# Patient Record
Sex: MALE | Race: OTHER | HISPANIC OR LATINO
[De-identification: names, ages, dates, MRNs, and addresses within clinical notes are randomized per-mention and may not be internally consistent; named-entity substitution may affect disease eponyms.]

---

## 2017-03-29 ENCOUNTER — APPOINTMENT (OUTPATIENT)
Dept: ELECTROPHYSIOLOGY | Facility: CLINIC | Age: 62
End: 2017-03-29

## 2017-04-11 ENCOUNTER — TRANSCRIPTION ENCOUNTER (OUTPATIENT)
Age: 62
End: 2017-04-11

## 2017-05-28 ENCOUNTER — EMERGENCY (EMERGENCY)
Facility: HOSPITAL | Age: 62
LOS: 1 days | Discharge: HOME | End: 2017-05-28
Attending: EMERGENCY MEDICINE | Admitting: EMERGENCY MEDICINE
Payer: COMMERCIAL

## 2017-05-28 VITALS
OXYGEN SATURATION: 98 % | HEART RATE: 67 BPM | SYSTOLIC BLOOD PRESSURE: 131 MMHG | TEMPERATURE: 98 F | RESPIRATION RATE: 18 BRPM | DIASTOLIC BLOOD PRESSURE: 83 MMHG

## 2017-05-28 VITALS
OXYGEN SATURATION: 100 % | TEMPERATURE: 98 F | HEART RATE: 68 BPM | RESPIRATION RATE: 18 BRPM | SYSTOLIC BLOOD PRESSURE: 137 MMHG | DIASTOLIC BLOOD PRESSURE: 91 MMHG

## 2017-05-28 DIAGNOSIS — E11.9 TYPE 2 DIABETES MELLITUS WITHOUT COMPLICATIONS: ICD-10-CM

## 2017-05-28 DIAGNOSIS — Z79.82 LONG TERM (CURRENT) USE OF ASPIRIN: ICD-10-CM

## 2017-05-28 DIAGNOSIS — R10.9 UNSPECIFIED ABDOMINAL PAIN: ICD-10-CM

## 2017-05-28 DIAGNOSIS — E78.5 HYPERLIPIDEMIA, UNSPECIFIED: ICD-10-CM

## 2017-05-28 DIAGNOSIS — I10 ESSENTIAL (PRIMARY) HYPERTENSION: ICD-10-CM

## 2017-05-28 DIAGNOSIS — Z95.1 PRESENCE OF AORTOCORONARY BYPASS GRAFT: ICD-10-CM

## 2017-05-28 DIAGNOSIS — N20.0 CALCULUS OF KIDNEY: ICD-10-CM

## 2017-05-28 DIAGNOSIS — I25.10 ATHEROSCLEROTIC HEART DISEASE OF NATIVE CORONARY ARTERY WITHOUT ANGINA PECTORIS: ICD-10-CM

## 2017-05-28 LAB
ALBUMIN SERPL ELPH-MCNC: 4.5 G/DL — SIGNIFICANT CHANGE UP (ref 3.3–5)
ALP SERPL-CCNC: 58 U/L — SIGNIFICANT CHANGE UP (ref 40–120)
ALT FLD-CCNC: 15 U/L RC — SIGNIFICANT CHANGE UP (ref 10–45)
ANION GAP SERPL CALC-SCNC: 13 MMOL/L — SIGNIFICANT CHANGE UP (ref 5–17)
APPEARANCE UR: CLEAR — SIGNIFICANT CHANGE UP
AST SERPL-CCNC: 23 U/L — SIGNIFICANT CHANGE UP (ref 10–40)
BASOPHILS # BLD AUTO: 0 K/UL — SIGNIFICANT CHANGE UP (ref 0–0.2)
BASOPHILS NFR BLD AUTO: 0.2 % — SIGNIFICANT CHANGE UP (ref 0–2)
BILIRUB SERPL-MCNC: 0.4 MG/DL — SIGNIFICANT CHANGE UP (ref 0.2–1.2)
BILIRUB UR-MCNC: NEGATIVE — SIGNIFICANT CHANGE UP
BUN SERPL-MCNC: 18 MG/DL — SIGNIFICANT CHANGE UP (ref 7–23)
CALCIUM SERPL-MCNC: 9.7 MG/DL — SIGNIFICANT CHANGE UP (ref 8.4–10.5)
CHLORIDE SERPL-SCNC: 106 MMOL/L — SIGNIFICANT CHANGE UP (ref 96–108)
CO2 SERPL-SCNC: 26 MMOL/L — SIGNIFICANT CHANGE UP (ref 22–31)
COLOR SPEC: YELLOW — SIGNIFICANT CHANGE UP
CREAT SERPL-MCNC: 1.29 MG/DL — SIGNIFICANT CHANGE UP (ref 0.5–1.3)
DIFF PNL FLD: NEGATIVE — SIGNIFICANT CHANGE UP
EOSINOPHIL # BLD AUTO: 0.2 K/UL — SIGNIFICANT CHANGE UP (ref 0–0.5)
EOSINOPHIL NFR BLD AUTO: 3 % — SIGNIFICANT CHANGE UP (ref 0–6)
EPI CELLS # UR: SIGNIFICANT CHANGE UP /HPF
GLUCOSE SERPL-MCNC: 108 MG/DL — HIGH (ref 70–99)
GLUCOSE UR QL: NEGATIVE — SIGNIFICANT CHANGE UP
HCT VFR BLD CALC: 39.7 % — SIGNIFICANT CHANGE UP (ref 39–50)
HGB BLD-MCNC: 13.6 G/DL — SIGNIFICANT CHANGE UP (ref 13–17)
KETONES UR-MCNC: NEGATIVE — SIGNIFICANT CHANGE UP
LEUKOCYTE ESTERASE UR-ACNC: NEGATIVE — SIGNIFICANT CHANGE UP
LYMPHOCYTES # BLD AUTO: 1.2 K/UL — SIGNIFICANT CHANGE UP (ref 1–3.3)
LYMPHOCYTES # BLD AUTO: 17.6 % — SIGNIFICANT CHANGE UP (ref 13–44)
MCHC RBC-ENTMCNC: 31.7 PG — SIGNIFICANT CHANGE UP (ref 27–34)
MCHC RBC-ENTMCNC: 34.3 GM/DL — SIGNIFICANT CHANGE UP (ref 32–36)
MCV RBC AUTO: 92.6 FL — SIGNIFICANT CHANGE UP (ref 80–100)
MONOCYTES # BLD AUTO: 0.5 K/UL — SIGNIFICANT CHANGE UP (ref 0–0.9)
MONOCYTES NFR BLD AUTO: 6.8 % — SIGNIFICANT CHANGE UP (ref 2–14)
NEUTROPHILS # BLD AUTO: 5.1 K/UL — SIGNIFICANT CHANGE UP (ref 1.8–7.4)
NEUTROPHILS NFR BLD AUTO: 72.4 % — SIGNIFICANT CHANGE UP (ref 43–77)
NITRITE UR-MCNC: NEGATIVE — SIGNIFICANT CHANGE UP
PH UR: 5.5 — SIGNIFICANT CHANGE UP (ref 5–8)
PLATELET # BLD AUTO: 298 K/UL — SIGNIFICANT CHANGE UP (ref 150–400)
POTASSIUM SERPL-MCNC: 4.3 MMOL/L — SIGNIFICANT CHANGE UP (ref 3.5–5.3)
POTASSIUM SERPL-SCNC: 4.3 MMOL/L — SIGNIFICANT CHANGE UP (ref 3.5–5.3)
PROT SERPL-MCNC: 7.5 G/DL — SIGNIFICANT CHANGE UP (ref 6–8.3)
PROT UR-MCNC: SIGNIFICANT CHANGE UP
RBC # BLD: 4.29 M/UL — SIGNIFICANT CHANGE UP (ref 4.2–5.8)
RBC # FLD: 11.6 % — SIGNIFICANT CHANGE UP (ref 10.3–14.5)
SODIUM SERPL-SCNC: 145 MMOL/L — SIGNIFICANT CHANGE UP (ref 135–145)
SP GR SPEC: 1.02 — SIGNIFICANT CHANGE UP (ref 1.01–1.02)
UROBILINOGEN FLD QL: NEGATIVE — SIGNIFICANT CHANGE UP
WBC # BLD: 7.1 K/UL — SIGNIFICANT CHANGE UP (ref 3.8–10.5)
WBC # FLD AUTO: 7.1 K/UL — SIGNIFICANT CHANGE UP (ref 3.8–10.5)
WBC UR QL: SIGNIFICANT CHANGE UP /HPF (ref 0–5)

## 2017-05-28 PROCEDURE — 96375 TX/PRO/DX INJ NEW DRUG ADDON: CPT

## 2017-05-28 PROCEDURE — 80053 COMPREHEN METABOLIC PANEL: CPT

## 2017-05-28 PROCEDURE — 96374 THER/PROPH/DIAG INJ IV PUSH: CPT

## 2017-05-28 PROCEDURE — 87086 URINE CULTURE/COLONY COUNT: CPT

## 2017-05-28 PROCEDURE — 74176 CT ABD & PELVIS W/O CONTRAST: CPT | Mod: 26

## 2017-05-28 PROCEDURE — 81001 URINALYSIS AUTO W/SCOPE: CPT

## 2017-05-28 PROCEDURE — 99284 EMERGENCY DEPT VISIT MOD MDM: CPT | Mod: 25

## 2017-05-28 PROCEDURE — 85027 COMPLETE CBC AUTOMATED: CPT

## 2017-05-28 PROCEDURE — 74176 CT ABD & PELVIS W/O CONTRAST: CPT

## 2017-05-28 PROCEDURE — 99285 EMERGENCY DEPT VISIT HI MDM: CPT | Mod: 25

## 2017-05-28 RX ORDER — ONDANSETRON 8 MG/1
4 TABLET, FILM COATED ORAL ONCE
Qty: 0 | Refills: 0 | Status: COMPLETED | OUTPATIENT
Start: 2017-05-28 | End: 2017-05-28

## 2017-05-28 RX ORDER — TAMSULOSIN HYDROCHLORIDE 0.4 MG/1
1 CAPSULE ORAL
Qty: 30 | Refills: 0 | OUTPATIENT
Start: 2017-05-28 | End: 2017-06-27

## 2017-05-28 RX ORDER — ONDANSETRON 8 MG/1
1 TABLET, FILM COATED ORAL
Qty: 12 | Refills: 0 | OUTPATIENT
Start: 2017-05-28 | End: 2017-06-01

## 2017-05-28 RX ORDER — MORPHINE SULFATE 50 MG/1
4 CAPSULE, EXTENDED RELEASE ORAL ONCE
Qty: 0 | Refills: 0 | Status: DISCONTINUED | OUTPATIENT
Start: 2017-05-28 | End: 2017-05-28

## 2017-05-28 RX ORDER — OXYCODONE HYDROCHLORIDE 5 MG/1
1 TABLET ORAL
Qty: 12 | Refills: 0 | OUTPATIENT
Start: 2017-05-28 | End: 2017-05-31

## 2017-05-28 RX ORDER — KETOROLAC TROMETHAMINE 30 MG/ML
15 SYRINGE (ML) INJECTION ONCE
Qty: 0 | Refills: 0 | Status: DISCONTINUED | OUTPATIENT
Start: 2017-05-28 | End: 2017-05-28

## 2017-05-28 RX ORDER — OXYCODONE HYDROCHLORIDE 5 MG/1
5 TABLET ORAL ONCE
Qty: 0 | Refills: 0 | Status: DISCONTINUED | OUTPATIENT
Start: 2017-05-28 | End: 2017-05-28

## 2017-05-28 RX ORDER — OXYCODONE HYDROCHLORIDE 5 MG/1
1 TABLET ORAL
Qty: 16 | Refills: 0 | OUTPATIENT
Start: 2017-05-28 | End: 2017-06-01

## 2017-05-28 RX ORDER — SODIUM CHLORIDE 9 MG/ML
1000 INJECTION INTRAMUSCULAR; INTRAVENOUS; SUBCUTANEOUS ONCE
Qty: 0 | Refills: 0 | Status: COMPLETED | OUTPATIENT
Start: 2017-05-28 | End: 2017-05-28

## 2017-05-28 RX ADMIN — OXYCODONE HYDROCHLORIDE 5 MILLIGRAM(S): 5 TABLET ORAL at 17:23

## 2017-05-28 RX ADMIN — MORPHINE SULFATE 4 MILLIGRAM(S): 50 CAPSULE, EXTENDED RELEASE ORAL at 17:23

## 2017-05-28 RX ADMIN — SODIUM CHLORIDE 1000 MILLILITER(S): 9 INJECTION INTRAMUSCULAR; INTRAVENOUS; SUBCUTANEOUS at 14:50

## 2017-05-28 RX ADMIN — MORPHINE SULFATE 4 MILLIGRAM(S): 50 CAPSULE, EXTENDED RELEASE ORAL at 14:57

## 2017-05-28 RX ADMIN — Medication 15 MILLIGRAM(S): at 15:03

## 2017-05-28 RX ADMIN — ONDANSETRON 4 MILLIGRAM(S): 8 TABLET, FILM COATED ORAL at 14:51

## 2017-05-28 RX ADMIN — Medication 15 MILLIGRAM(S): at 17:23

## 2017-05-28 NOTE — ED PROVIDER NOTE - PHYSICAL EXAMINATION
Gen: Well appearing, NAD, AOx3  Head: NCAT  HEENT: MMM, normal conjunctiva  Lung: CTAB, no rales, rhonchi or wheezing  CV: S1/S2, no murmurs, rubs or gallops  Abd: soft, NTND, no rebound or guarding  MSK: No CVA tenderness. No edema, no visible deformities  Neuro: No focal neurologic deficits.   Skin: Warm and dry, no evidence of rash  Psych: normal mood and affect

## 2017-05-28 NOTE — ED PROVIDER NOTE - NS ED ROS FT
ROS: denies HA, weakness, dizziness, fevers/chills, nausea/vomiting, chest pain, SOB, diaphoresis, abdominal pain, neck pain, dysuria/hematuria, or rash  +flank pain

## 2017-05-28 NOTE — ED PROVIDER NOTE - ATTENDING CONTRIBUTION TO CARE
Attending MD Dawson:  I personally have seen and examined this patient.  Resident note reviewed and agree on plan of care and except where noted.  See HPI, PE, and MDM for details.     Attending MD Dawson: A & O x 3, NAD, HEENT WNL and no facial asymmetry; lungs CTAB, heart with reg rhythm without murmur; abdomen soft NTND; extremities with no edema; neuro exam non focal with no motor or sensory deficits. peripheral pulses full and equal in bilateral upper and lower extremities     61M with h/o kidney stones presenting with right flank pain, concerning for renal colic, will obtain CT a/p to confirm and rule out alternative pathology, UA and pain control

## 2017-05-28 NOTE — ED PROVIDER NOTE - PROGRESS NOTE DETAILS
Attending MD Dawson: pain well controlled, CT with 5mm proximal ureteral stone. Will dc home with urology follow up, flomax, pain meds, antiemetics

## 2017-05-28 NOTE — ED PROVIDER NOTE - MEDICAL DECISION MAKING DETAILS
61 y.o. male pw right flank pain, hx of stone. Will check labs, fluids, analgesia, ct ab/pel, reevaluate.

## 2017-05-28 NOTE — ED ADULT NURSE NOTE - PMH
Borderline diabetes mellitus    CAD (coronary artery disease)    HLD (hyperlipidemia)    HTN (hypertension)

## 2017-05-28 NOTE — ED PROVIDER NOTE - OBJECTIVE STATEMENT
61 y.o. male hx of kidney stones, sp lithotripsy and stenting in the past pw right flank pain for past couple of days, 10/10 at worst and nausea, no fevers, no dysuria. States feels like prior stones. Taking motrin at home with little relief. last dose 7am.

## 2017-05-28 NOTE — ED ADULT NURSE NOTE - OBJECTIVE STATEMENT
60yo male walked into ED a+ox3, c/o right flank pain x 3days, +n/v. Pt states he has a history of kidney stones, and this pain feels like a stone; has required lithotripsy in past. Pt having right flank pain x 3days, non-radiating, 9/10, aching. +nausea, +vomiting x 2, no blood. Pt denies fevers, chills, hematuria, dysuria, urinary retention, sob, cp, recent falls/back strain. Abd soft nontender nondistended. Lung sounds clear in all fields.

## 2017-05-29 LAB
CULTURE RESULTS: SIGNIFICANT CHANGE UP
SPECIMEN SOURCE: SIGNIFICANT CHANGE UP

## 2017-10-17 ENCOUNTER — OUTPATIENT (OUTPATIENT)
Dept: OUTPATIENT SERVICES | Facility: HOSPITAL | Age: 62
LOS: 1 days | End: 2017-10-17
Payer: COMMERCIAL

## 2017-10-17 VITALS
DIASTOLIC BLOOD PRESSURE: 73 MMHG | HEIGHT: 70 IN | WEIGHT: 225.09 LBS | HEART RATE: 71 BPM | RESPIRATION RATE: 16 BRPM | SYSTOLIC BLOOD PRESSURE: 116 MMHG | OXYGEN SATURATION: 99 % | TEMPERATURE: 98 F

## 2017-10-17 DIAGNOSIS — I66.9 OCCLUSION AND STENOSIS OF UNSPECIFIED CEREBRAL ARTERY: ICD-10-CM

## 2017-10-17 DIAGNOSIS — Z98.890 OTHER SPECIFIED POSTPROCEDURAL STATES: Chronic | ICD-10-CM

## 2017-10-17 PROCEDURE — 33284: CPT

## 2017-10-17 PROCEDURE — 93005 ELECTROCARDIOGRAM TRACING: CPT

## 2017-10-17 PROCEDURE — 93010 ELECTROCARDIOGRAM REPORT: CPT

## 2017-10-17 NOTE — H&P CARDIOLOGY - HISTORY OF PRESENT ILLNESS
62 y/o male with h/o CAD s/p CABG x 3 in 2008, HTN, HLD, T2DM controlled and uncomplicated, managed by PCP, A1C unknown, Afib on Eliquis, TIA (no residual) in 2015 and subsequent ILR placement which remains in place.  Patient presents today for ILR removal.

## 2017-10-25 ENCOUNTER — APPOINTMENT (OUTPATIENT)
Dept: ELECTROPHYSIOLOGY | Facility: CLINIC | Age: 62
End: 2017-10-25
Payer: COMMERCIAL

## 2017-10-25 ENCOUNTER — NON-APPOINTMENT (OUTPATIENT)
Age: 62
End: 2017-10-25

## 2017-10-25 VITALS
DIASTOLIC BLOOD PRESSURE: 77 MMHG | WEIGHT: 225 LBS | SYSTOLIC BLOOD PRESSURE: 113 MMHG | BODY MASS INDEX: 32.21 KG/M2 | OXYGEN SATURATION: 98 % | HEIGHT: 70 IN | HEART RATE: 62 BPM

## 2017-10-25 PROCEDURE — 99024 POSTOP FOLLOW-UP VISIT: CPT

## 2017-10-30 ENCOUNTER — APPOINTMENT (OUTPATIENT)
Dept: ELECTROPHYSIOLOGY | Facility: CLINIC | Age: 62
End: 2017-10-30

## 2018-10-16 ENCOUNTER — INPATIENT (INPATIENT)
Facility: HOSPITAL | Age: 63
LOS: 2 days | DRG: 23 | End: 2018-10-19
Attending: INTERNAL MEDICINE | Admitting: SPECIALIST
Payer: COMMERCIAL

## 2018-10-16 VITALS
OXYGEN SATURATION: 98 % | RESPIRATION RATE: 18 BRPM | TEMPERATURE: 98 F | SYSTOLIC BLOOD PRESSURE: 144 MMHG | DIASTOLIC BLOOD PRESSURE: 88 MMHG | HEART RATE: 84 BPM

## 2018-10-16 DIAGNOSIS — Z98.890 OTHER SPECIFIED POSTPROCEDURAL STATES: Chronic | ICD-10-CM

## 2018-10-16 DIAGNOSIS — I63.9 CEREBRAL INFARCTION, UNSPECIFIED: ICD-10-CM

## 2018-10-16 PROBLEM — E11.9 TYPE 2 DIABETES MELLITUS WITHOUT COMPLICATIONS: Chronic | Status: ACTIVE | Noted: 2017-10-17

## 2018-10-16 PROBLEM — G45.9 TRANSIENT CEREBRAL ISCHEMIC ATTACK, UNSPECIFIED: Chronic | Status: ACTIVE | Noted: 2017-10-17

## 2018-10-16 PROBLEM — N20.0 CALCULUS OF KIDNEY: Chronic | Status: ACTIVE | Noted: 2017-10-17

## 2018-10-16 PROBLEM — I48.91 UNSPECIFIED ATRIAL FIBRILLATION: Chronic | Status: ACTIVE | Noted: 2017-10-17

## 2018-10-16 LAB
ALBUMIN SERPL ELPH-MCNC: 4.2 G/DL — SIGNIFICANT CHANGE UP (ref 3.3–5)
ALP SERPL-CCNC: 91 U/L — SIGNIFICANT CHANGE UP (ref 40–120)
ALT FLD-CCNC: 17 U/L — SIGNIFICANT CHANGE UP (ref 10–45)
ANION GAP SERPL CALC-SCNC: 12 MMOL/L — SIGNIFICANT CHANGE UP (ref 5–17)
ANION GAP SERPL CALC-SCNC: 14 MMOL/L — SIGNIFICANT CHANGE UP (ref 5–17)
ANION GAP SERPL CALC-SCNC: 15 MMOL/L — SIGNIFICANT CHANGE UP (ref 5–17)
APTT BLD: 28.2 SEC — SIGNIFICANT CHANGE UP (ref 27.5–37.4)
APTT BLD: 30.8 SEC — SIGNIFICANT CHANGE UP (ref 27.5–37.4)
APTT BLD: 33.8 SEC — SIGNIFICANT CHANGE UP (ref 27.5–37.4)
AST SERPL-CCNC: 20 U/L — SIGNIFICANT CHANGE UP (ref 10–40)
BASE EXCESS BLDA CALC-SCNC: -0.8 MMOL/L — SIGNIFICANT CHANGE UP (ref -2–2)
BASOPHILS # BLD AUTO: 0 K/UL — SIGNIFICANT CHANGE UP (ref 0–0.2)
BASOPHILS NFR BLD AUTO: 0.7 % — SIGNIFICANT CHANGE UP (ref 0–2)
BILIRUB SERPL-MCNC: 0.3 MG/DL — SIGNIFICANT CHANGE UP (ref 0.2–1.2)
BUN SERPL-MCNC: 14 MG/DL — SIGNIFICANT CHANGE UP (ref 7–23)
BUN SERPL-MCNC: 14 MG/DL — SIGNIFICANT CHANGE UP (ref 7–23)
BUN SERPL-MCNC: 15 MG/DL — SIGNIFICANT CHANGE UP (ref 7–23)
CALCIUM SERPL-MCNC: 8.3 MG/DL — LOW (ref 8.4–10.5)
CALCIUM SERPL-MCNC: 8.5 MG/DL — SIGNIFICANT CHANGE UP (ref 8.4–10.5)
CALCIUM SERPL-MCNC: 9.1 MG/DL — SIGNIFICANT CHANGE UP (ref 8.4–10.5)
CHLORIDE SERPL-SCNC: 102 MMOL/L — SIGNIFICANT CHANGE UP (ref 96–108)
CHLORIDE SERPL-SCNC: 103 MMOL/L — SIGNIFICANT CHANGE UP (ref 96–108)
CHLORIDE SERPL-SCNC: 104 MMOL/L — SIGNIFICANT CHANGE UP (ref 96–108)
CK SERPL-CCNC: 226 U/L — HIGH (ref 30–200)
CO2 BLDA-SCNC: 28 MMOL/L — SIGNIFICANT CHANGE UP (ref 22–30)
CO2 SERPL-SCNC: 22 MMOL/L — SIGNIFICANT CHANGE UP (ref 22–31)
CO2 SERPL-SCNC: 24 MMOL/L — SIGNIFICANT CHANGE UP (ref 22–31)
CO2 SERPL-SCNC: 25 MMOL/L — SIGNIFICANT CHANGE UP (ref 22–31)
CREAT SERPL-MCNC: 1.17 MG/DL — SIGNIFICANT CHANGE UP (ref 0.5–1.3)
CREAT SERPL-MCNC: 1.18 MG/DL — SIGNIFICANT CHANGE UP (ref 0.5–1.3)
CREAT SERPL-MCNC: 1.37 MG/DL — HIGH (ref 0.5–1.3)
EOSINOPHIL # BLD AUTO: 0.4 K/UL — SIGNIFICANT CHANGE UP (ref 0–0.5)
EOSINOPHIL NFR BLD AUTO: 5.6 % — SIGNIFICANT CHANGE UP (ref 0–6)
FIBRINOGEN PPP-MCNC: 493 MG/DL — SIGNIFICANT CHANGE UP (ref 310–510)
GAS PNL BLDA: SIGNIFICANT CHANGE UP
GAS PNL BLDA: SIGNIFICANT CHANGE UP
GLUCOSE SERPL-MCNC: 109 MG/DL — HIGH (ref 70–99)
GLUCOSE SERPL-MCNC: 133 MG/DL — HIGH (ref 70–99)
GLUCOSE SERPL-MCNC: 198 MG/DL — HIGH (ref 70–99)
HBA1C BLD-MCNC: 5.8 % — HIGH (ref 4–5.6)
HCO3 BLDA-SCNC: 26 MMOL/L — SIGNIFICANT CHANGE UP (ref 21–29)
HCT VFR BLD CALC: 34.2 % — LOW (ref 39–50)
HCT VFR BLD CALC: 34.2 % — LOW (ref 39–50)
HCT VFR BLD CALC: 40.5 % — SIGNIFICANT CHANGE UP (ref 39–50)
HGB BLD-MCNC: 11.5 G/DL — LOW (ref 13–17)
HGB BLD-MCNC: 11.7 G/DL — LOW (ref 13–17)
HGB BLD-MCNC: 13.7 G/DL — SIGNIFICANT CHANGE UP (ref 13–17)
HOROWITZ INDEX BLDA+IHG-RTO: 40 — SIGNIFICANT CHANGE UP
INR BLD: 0.93 RATIO — SIGNIFICANT CHANGE UP (ref 0.88–1.16)
INR BLD: 1.25 RATIO — HIGH (ref 0.88–1.16)
INR BLD: 1.4 RATIO — HIGH (ref 0.88–1.16)
LYMPHOCYTES # BLD AUTO: 1.5 K/UL — SIGNIFICANT CHANGE UP (ref 1–3.3)
LYMPHOCYTES # BLD AUTO: 24.3 % — SIGNIFICANT CHANGE UP (ref 13–44)
MAGNESIUM SERPL-MCNC: 1.8 MG/DL — SIGNIFICANT CHANGE UP (ref 1.6–2.6)
MCHC RBC-ENTMCNC: 30.1 PG — SIGNIFICANT CHANGE UP (ref 27–34)
MCHC RBC-ENTMCNC: 30.6 PG — SIGNIFICANT CHANGE UP (ref 27–34)
MCHC RBC-ENTMCNC: 30.7 PG — SIGNIFICANT CHANGE UP (ref 27–34)
MCHC RBC-ENTMCNC: 33.6 GM/DL — SIGNIFICANT CHANGE UP (ref 32–36)
MCHC RBC-ENTMCNC: 33.9 GM/DL — SIGNIFICANT CHANGE UP (ref 32–36)
MCHC RBC-ENTMCNC: 34.1 GM/DL — SIGNIFICANT CHANGE UP (ref 32–36)
MCV RBC AUTO: 89.6 FL — SIGNIFICANT CHANGE UP (ref 80–100)
MCV RBC AUTO: 90.1 FL — SIGNIFICANT CHANGE UP (ref 80–100)
MCV RBC AUTO: 90.4 FL — SIGNIFICANT CHANGE UP (ref 80–100)
MONOCYTES # BLD AUTO: 0.6 K/UL — SIGNIFICANT CHANGE UP (ref 0–0.9)
MONOCYTES NFR BLD AUTO: 9.4 % — SIGNIFICANT CHANGE UP (ref 2–14)
NEUTROPHILS # BLD AUTO: 3.8 K/UL — SIGNIFICANT CHANGE UP (ref 1.8–7.4)
NEUTROPHILS NFR BLD AUTO: 60 % — SIGNIFICANT CHANGE UP (ref 43–77)
PCO2 BLDA: 59 MMHG — HIGH (ref 32–46)
PH BLDA: 7.28 — LOW (ref 7.35–7.45)
PHOSPHATE SERPL-MCNC: 4 MG/DL — SIGNIFICANT CHANGE UP (ref 2.5–4.5)
PLATELET # BLD AUTO: 261 K/UL — SIGNIFICANT CHANGE UP (ref 150–400)
PLATELET # BLD AUTO: 300 K/UL — SIGNIFICANT CHANGE UP (ref 150–400)
PLATELET # BLD AUTO: 343 K/UL — SIGNIFICANT CHANGE UP (ref 150–400)
PO2 BLDA: 111 MMHG — HIGH (ref 74–108)
POTASSIUM SERPL-MCNC: 3.7 MMOL/L — SIGNIFICANT CHANGE UP (ref 3.5–5.3)
POTASSIUM SERPL-MCNC: 4 MMOL/L — SIGNIFICANT CHANGE UP (ref 3.5–5.3)
POTASSIUM SERPL-MCNC: 4.1 MMOL/L — SIGNIFICANT CHANGE UP (ref 3.5–5.3)
POTASSIUM SERPL-SCNC: 3.7 MMOL/L — SIGNIFICANT CHANGE UP (ref 3.5–5.3)
POTASSIUM SERPL-SCNC: 4 MMOL/L — SIGNIFICANT CHANGE UP (ref 3.5–5.3)
POTASSIUM SERPL-SCNC: 4.1 MMOL/L — SIGNIFICANT CHANGE UP (ref 3.5–5.3)
PROT SERPL-MCNC: 7.3 G/DL — SIGNIFICANT CHANGE UP (ref 6–8.3)
PROTHROM AB SERPL-ACNC: 10 SEC — SIGNIFICANT CHANGE UP (ref 9.8–12.7)
PROTHROM AB SERPL-ACNC: 13.7 SEC — HIGH (ref 9.8–12.7)
PROTHROM AB SERPL-ACNC: 15.4 SEC — HIGH (ref 9.8–12.7)
RBC # BLD: 3.8 M/UL — LOW (ref 4.2–5.8)
RBC # BLD: 3.82 M/UL — LOW (ref 4.2–5.8)
RBC # BLD: 4.48 M/UL — SIGNIFICANT CHANGE UP (ref 4.2–5.8)
RBC # FLD: 11.5 % — SIGNIFICANT CHANGE UP (ref 10.3–14.5)
RBC # FLD: 11.7 % — SIGNIFICANT CHANGE UP (ref 10.3–14.5)
RBC # FLD: 12 % — SIGNIFICANT CHANGE UP (ref 10.3–14.5)
RH IG SCN BLD-IMP: POSITIVE — SIGNIFICANT CHANGE UP
SAO2 % BLDA: 98 % — HIGH (ref 92–96)
SODIUM SERPL-SCNC: 140 MMOL/L — SIGNIFICANT CHANGE UP (ref 135–145)
SODIUM SERPL-SCNC: 140 MMOL/L — SIGNIFICANT CHANGE UP (ref 135–145)
SODIUM SERPL-SCNC: 141 MMOL/L — SIGNIFICANT CHANGE UP (ref 135–145)
TROPONIN T, HIGH SENSITIVITY RESULT: 9 NG/L — SIGNIFICANT CHANGE UP (ref 0–51)
WBC # BLD: 11.5 K/UL — HIGH (ref 3.8–10.5)
WBC # BLD: 6.2 K/UL — SIGNIFICANT CHANGE UP (ref 3.8–10.5)
WBC # BLD: 9.4 K/UL — SIGNIFICANT CHANGE UP (ref 3.8–10.5)
WBC # FLD AUTO: 11.5 K/UL — HIGH (ref 3.8–10.5)
WBC # FLD AUTO: 6.2 K/UL — SIGNIFICANT CHANGE UP (ref 3.8–10.5)
WBC # FLD AUTO: 9.4 K/UL — SIGNIFICANT CHANGE UP (ref 3.8–10.5)

## 2018-10-16 PROCEDURE — 70496 CT ANGIOGRAPHY HEAD: CPT | Mod: 26

## 2018-10-16 PROCEDURE — 71045 X-RAY EXAM CHEST 1 VIEW: CPT | Mod: 26

## 2018-10-16 PROCEDURE — 99291 CRITICAL CARE FIRST HOUR: CPT

## 2018-10-16 PROCEDURE — 99285 EMERGENCY DEPT VISIT HI MDM: CPT | Mod: 25

## 2018-10-16 PROCEDURE — 70498 CT ANGIOGRAPHY NECK: CPT | Mod: 26

## 2018-10-16 PROCEDURE — 99222 1ST HOSP IP/OBS MODERATE 55: CPT

## 2018-10-16 PROCEDURE — 70450 CT HEAD/BRAIN W/O DYE: CPT | Mod: 26,77

## 2018-10-16 PROCEDURE — 93010 ELECTROCARDIOGRAM REPORT: CPT | Mod: NC

## 2018-10-16 RX ORDER — ALTEPLASE 100 MG
81 KIT INTRAVENOUS ONCE
Qty: 0 | Refills: 0 | Status: COMPLETED | OUTPATIENT
Start: 2018-10-16 | End: 2018-10-16

## 2018-10-16 RX ORDER — DEXTROSE 50 % IN WATER 50 %
25 SYRINGE (ML) INTRAVENOUS ONCE
Qty: 0 | Refills: 0 | Status: DISCONTINUED | OUTPATIENT
Start: 2018-10-16 | End: 2018-10-17

## 2018-10-16 RX ORDER — LIRAGLUTIDE 6 MG/ML
1.2 INJECTION SUBCUTANEOUS
Qty: 0 | Refills: 0 | COMMUNITY

## 2018-10-16 RX ORDER — FOLIC ACID 0.8 MG
1 TABLET ORAL DAILY
Qty: 0 | Refills: 0 | Status: DISCONTINUED | OUTPATIENT
Start: 2018-10-16 | End: 2018-10-18

## 2018-10-16 RX ORDER — ATORVASTATIN CALCIUM 80 MG/1
80 TABLET, FILM COATED ORAL AT BEDTIME
Qty: 0 | Refills: 0 | Status: DISCONTINUED | OUTPATIENT
Start: 2018-10-16 | End: 2018-10-16

## 2018-10-16 RX ORDER — PHENYLEPHRINE HYDROCHLORIDE 10 MG/ML
1 INJECTION INTRAVENOUS
Qty: 40 | Refills: 0 | Status: DISCONTINUED | OUTPATIENT
Start: 2018-10-16 | End: 2018-10-17

## 2018-10-16 RX ORDER — PANTOPRAZOLE SODIUM 20 MG/1
40 TABLET, DELAYED RELEASE ORAL DAILY
Qty: 0 | Refills: 0 | Status: DISCONTINUED | OUTPATIENT
Start: 2018-10-16 | End: 2018-10-19

## 2018-10-16 RX ORDER — DEXMEDETOMIDINE HYDROCHLORIDE IN 0.9% SODIUM CHLORIDE 4 UG/ML
0.2 INJECTION INTRAVENOUS
Qty: 200 | Refills: 0 | Status: DISCONTINUED | OUTPATIENT
Start: 2018-10-16 | End: 2018-10-16

## 2018-10-16 RX ORDER — DEXTROSE MONOHYDRATE, SODIUM CHLORIDE, AND POTASSIUM CHLORIDE 50; .745; 4.5 G/1000ML; G/1000ML; G/1000ML
1000 INJECTION, SOLUTION INTRAVENOUS
Qty: 0 | Refills: 0 | Status: DISCONTINUED | OUTPATIENT
Start: 2018-10-16 | End: 2018-10-17

## 2018-10-16 RX ORDER — SODIUM CHLORIDE 9 MG/ML
1000 INJECTION INTRAMUSCULAR; INTRAVENOUS; SUBCUTANEOUS
Qty: 0 | Refills: 0 | Status: DISCONTINUED | OUTPATIENT
Start: 2018-10-16 | End: 2018-10-16

## 2018-10-16 RX ORDER — FENTANYL CITRATE 50 UG/ML
50 INJECTION INTRAVENOUS ONCE
Qty: 0 | Refills: 0 | Status: DISCONTINUED | OUTPATIENT
Start: 2018-10-16 | End: 2018-10-16

## 2018-10-16 RX ORDER — EVOLOCUMAB 140 MG/ML
0 INJECTION, SOLUTION SUBCUTANEOUS
Qty: 0 | Refills: 0 | COMMUNITY

## 2018-10-16 RX ORDER — DEXTROSE 50 % IN WATER 50 %
12.5 SYRINGE (ML) INTRAVENOUS ONCE
Qty: 0 | Refills: 0 | Status: DISCONTINUED | OUTPATIENT
Start: 2018-10-16 | End: 2018-10-17

## 2018-10-16 RX ORDER — CARVEDILOL PHOSPHATE 80 MG/1
1 CAPSULE, EXTENDED RELEASE ORAL
Qty: 0 | Refills: 0 | COMMUNITY

## 2018-10-16 RX ORDER — METFORMIN HYDROCHLORIDE 850 MG/1
1 TABLET ORAL
Qty: 0 | Refills: 0 | COMMUNITY

## 2018-10-16 RX ORDER — FOLIC ACID 0.8 MG
1 TABLET ORAL
Qty: 0 | Refills: 0 | COMMUNITY

## 2018-10-16 RX ORDER — FUROSEMIDE 40 MG
20 TABLET ORAL ONCE
Qty: 0 | Refills: 0 | Status: DISCONTINUED | OUTPATIENT
Start: 2018-10-16 | End: 2018-10-16

## 2018-10-16 RX ORDER — DEXMEDETOMIDINE HYDROCHLORIDE IN 0.9% SODIUM CHLORIDE 4 UG/ML
0.2 INJECTION INTRAVENOUS
Qty: 200 | Refills: 0 | Status: DISCONTINUED | OUTPATIENT
Start: 2018-10-16 | End: 2018-10-18

## 2018-10-16 RX ORDER — POTASSIUM CHLORIDE 20 MEQ
20 PACKET (EA) ORAL ONCE
Qty: 0 | Refills: 0 | Status: COMPLETED | OUTPATIENT
Start: 2018-10-16 | End: 2018-10-16

## 2018-10-16 RX ORDER — PROTHROMBIN COMPLEX CONCENTRATE (HUMAN) 25.5; 16.5; 24; 22; 22; 26 [IU]/ML; [IU]/ML; [IU]/ML; [IU]/ML; [IU]/ML; [IU]/ML
1000 POWDER, FOR SOLUTION INTRAVENOUS ONCE
Qty: 0 | Refills: 0 | Status: DISCONTINUED | OUTPATIENT
Start: 2018-10-16 | End: 2018-10-16

## 2018-10-16 RX ORDER — INSULIN LISPRO 100/ML
VIAL (ML) SUBCUTANEOUS
Qty: 0 | Refills: 0 | Status: DISCONTINUED | OUTPATIENT
Start: 2018-10-16 | End: 2018-10-17

## 2018-10-16 RX ORDER — ALTEPLASE 100 MG
9 KIT INTRAVENOUS ONCE
Qty: 0 | Refills: 0 | Status: COMPLETED | OUTPATIENT
Start: 2018-10-16 | End: 2018-10-16

## 2018-10-16 RX ORDER — ASPIRIN/CALCIUM CARB/MAGNESIUM 324 MG
1 TABLET ORAL
Qty: 0 | Refills: 0 | COMMUNITY

## 2018-10-16 RX ORDER — ACETAMINOPHEN 500 MG
1000 TABLET ORAL ONCE
Qty: 0 | Refills: 0 | Status: COMPLETED | OUTPATIENT
Start: 2018-10-16 | End: 2018-10-16

## 2018-10-16 RX ORDER — CHLORHEXIDINE GLUCONATE 213 G/1000ML
15 SOLUTION TOPICAL
Qty: 0 | Refills: 0 | Status: DISCONTINUED | OUTPATIENT
Start: 2018-10-16 | End: 2018-10-19

## 2018-10-16 RX ORDER — DEXTROSE 50 % IN WATER 50 %
15 SYRINGE (ML) INTRAVENOUS ONCE
Qty: 0 | Refills: 0 | Status: DISCONTINUED | OUTPATIENT
Start: 2018-10-16 | End: 2018-10-17

## 2018-10-16 RX ORDER — PROTHROMBIN COMPLEX CONCENTRATE (HUMAN) 25.5; 16.5; 24; 22; 22; 26 [IU]/ML; [IU]/ML; [IU]/ML; [IU]/ML; [IU]/ML; [IU]/ML
1500 POWDER, FOR SOLUTION INTRAVENOUS ONCE
Qty: 0 | Refills: 0 | Status: DISCONTINUED | OUTPATIENT
Start: 2018-10-16 | End: 2018-10-16

## 2018-10-16 RX ORDER — GLUCAGON INJECTION, SOLUTION 0.5 MG/.1ML
1 INJECTION, SOLUTION SUBCUTANEOUS ONCE
Qty: 0 | Refills: 0 | Status: DISCONTINUED | OUTPATIENT
Start: 2018-10-16 | End: 2018-10-18

## 2018-10-16 RX ORDER — UBIDECARENONE 100 MG
0 CAPSULE ORAL
Qty: 0 | Refills: 0 | COMMUNITY

## 2018-10-16 RX ORDER — ATORVASTATIN CALCIUM 80 MG/1
80 TABLET, FILM COATED ORAL AT BEDTIME
Qty: 0 | Refills: 0 | Status: DISCONTINUED | OUTPATIENT
Start: 2018-10-16 | End: 2018-10-18

## 2018-10-16 RX ORDER — DESMOPRESSIN ACETATE 0.1 MG/1
30 TABLET ORAL ONCE
Qty: 0 | Refills: 0 | Status: COMPLETED | OUTPATIENT
Start: 2018-10-16 | End: 2018-10-16

## 2018-10-16 RX ORDER — PHENYLEPHRINE HYDROCHLORIDE 10 MG/ML
1 INJECTION INTRAVENOUS
Qty: 160 | Refills: 0 | Status: DISCONTINUED | OUTPATIENT
Start: 2018-10-16 | End: 2018-10-16

## 2018-10-16 RX ORDER — FUROSEMIDE 40 MG
10 TABLET ORAL ONCE
Qty: 0 | Refills: 0 | Status: COMPLETED | OUTPATIENT
Start: 2018-10-16 | End: 2018-10-16

## 2018-10-16 RX ORDER — SODIUM CHLORIDE 9 MG/ML
1000 INJECTION, SOLUTION INTRAVENOUS
Qty: 0 | Refills: 0 | Status: DISCONTINUED | OUTPATIENT
Start: 2018-10-16 | End: 2018-10-17

## 2018-10-16 RX ADMIN — FENTANYL CITRATE 50 MICROGRAM(S): 50 INJECTION INTRAVENOUS at 15:30

## 2018-10-16 RX ADMIN — FENTANYL CITRATE 50 MICROGRAM(S): 50 INJECTION INTRAVENOUS at 15:15

## 2018-10-16 RX ADMIN — Medication 50 MILLIEQUIVALENT(S): at 17:44

## 2018-10-16 RX ADMIN — ALTEPLASE 81 MILLIGRAM(S): KIT at 08:14

## 2018-10-16 RX ADMIN — ONDANSETRON 4 MILLIGRAM(S): 8 TABLET, FILM COATED ORAL at 22:00

## 2018-10-16 RX ADMIN — CHLORHEXIDINE GLUCONATE 15 MILLILITER(S): 213 SOLUTION TOPICAL at 17:44

## 2018-10-16 RX ADMIN — ALTEPLASE 81 MILLIGRAM(S): KIT at 07:14

## 2018-10-16 RX ADMIN — ATORVASTATIN CALCIUM 80 MILLIGRAM(S): 80 TABLET, FILM COATED ORAL at 21:14

## 2018-10-16 RX ADMIN — PHENYLEPHRINE HYDROCHLORIDE 39.52 MICROGRAM(S)/KG/MIN: 10 INJECTION INTRAVENOUS at 17:37

## 2018-10-16 RX ADMIN — Medication 1: at 16:45

## 2018-10-16 RX ADMIN — ALTEPLASE 540 MILLIGRAM(S): KIT at 07:13

## 2018-10-16 RX ADMIN — FENTANYL CITRATE 50 MICROGRAM(S): 50 INJECTION INTRAVENOUS at 15:45

## 2018-10-16 RX ADMIN — Medication 100 MILLIGRAM(S): at 23:35

## 2018-10-16 RX ADMIN — Medication 400 MILLIGRAM(S): at 12:19

## 2018-10-16 RX ADMIN — FENTANYL CITRATE 50 MICROGRAM(S): 50 INJECTION INTRAVENOUS at 15:00

## 2018-10-16 RX ADMIN — SODIUM CHLORIDE 75 MILLILITER(S): 9 INJECTION INTRAMUSCULAR; INTRAVENOUS; SUBCUTANEOUS at 08:20

## 2018-10-16 RX ADMIN — PANTOPRAZOLE SODIUM 40 MILLIGRAM(S): 20 TABLET, DELAYED RELEASE ORAL at 17:44

## 2018-10-16 RX ADMIN — Medication 10 MILLIGRAM(S): at 17:38

## 2018-10-16 RX ADMIN — DESMOPRESSIN ACETATE 230 MICROGRAM(S): 0.1 TABLET ORAL at 16:44

## 2018-10-16 RX ADMIN — DEXMEDETOMIDINE HYDROCHLORIDE IN 0.9% SODIUM CHLORIDE 5.27 MICROGRAM(S)/KG/HR: 4 INJECTION INTRAVENOUS at 17:38

## 2018-10-16 NOTE — PROGRESS NOTE ADULT - ASSESSMENT
Summary: 62 year old male pmhx of paroxysmal Afib (on Eliquis), CVA and TIA presented to ED w/ L sided weakness. NIHSS 3. Received tPA, and was noted to have worsening of neuro exam. Repeat CT revealed pontine hemorrhage.     NEURO:  q1h neuro checks  Received 10 units of cryoprecipitate, 1 unit of platelets, 30 DDAVP and 2 units of plasma  Received tPA at   post tpA CT at 6-8 hours    CARDS:  -150    PULM:  Intubated    RENAL:  IVL    GASTRO:  advance as tolerated  ---> Stress ulcer prophylaxis:  PPI    HEME:  monitor H/H    ---> DVT prophylaxis: SCDs, hold anticoagulation, fresh    ENDO:  euglycemia    ID:  afebrile    Code status:  Full code  Disposition:  ICU    This patient was at high risk of neurologic deterioration and/or death due to:     Time spent:  45 minutes Summary: 62 year old male pmhx of paroxysmal Afib (on Eliquis), CVA and TIA presented to ED w/ L sided weakness. NIHSS 3. Received tPA, and was noted to have worsening of neuro exam. Repeat CT revealed pontine hemorrhage.     NEURO:  q1h neuro checks  Received 10 units of cryoprecipitate, 1 unit of platelets, 30 DDAVP and 2 units of plasma  Received tPA at   post tpA CT at 6-8 hours    CARDS:  -150    PULM:  Intubated  VAP ppx Chlorhexidine  CXR    RENAL:  IVL    GASTRO:  advance as tolerated  ---> Stress ulcer prophylaxis:  PPI    HEME:  monitor H/H    ---> DVT prophylaxis: SCDs, hold anticoagulation, fresh    ENDO:  euglycemia    ID:  afebrile    Code status:  Full code  Disposition:  ICU    This patient was at high risk of neurologic deterioration and/or death due to:     Time spent:  45 minutes Summary: 62 year old male pmhx of paroxysmal Afib (on Eliquis), CVA and TIA presented to ED w/ L sided weakness. NIHSS 3. Received tPA, and was noted to have worsening of neuro exam. Repeat CT revealed pontine hemorrhage.     NEURO:  q1h neuro checks  Received 10 units of cryoprecipitate, 1 unit of platelets, 30 DDAVP and 2 units of plasma  Received tPA at 0713AM (10/15); at the time of tPA administration it was not known patient was on Eliquis  Patient will need post tpA CT to monitor bleed  Stroke core measures: A1c, lipid panel    CARDS:  SBP <140  Nicardipine gtt if needed    PULM:  Intubated  VAP ppx Chlorhexidine  CXR  ABG    RENAL:  NS w/ KCl @ 75 ml/hr    GASTRO:  NPO  ---> Stress ulcer prophylaxis:  PPI    HEME:  monitor H/H    ---> DVT prophylaxis: SCDs, hold anticoagulation due to brain bleed    ENDO:  euglycemia  SSI    ID:  Monitor for fevers    Code status:  Full code  Disposition:  ICU    This patient was at high risk of neurologic deterioration and/or death due to: brain bleed    Time spent:  45 minutes

## 2018-10-16 NOTE — ED PROVIDER NOTE - MEDICAL DECISION MAKING DETAILS
Alex Benitez (Resident): 61 y/o male presents with L leg and L arm weakness - code stroke called, last normal 3:15 am - within window for TPA - neuro at bedside - had discussion w/ patient, daughter, and wife about risks of TPA and patient agreed to TPA - admitting to stroke unit - BP WNL, safe to give TPA

## 2018-10-16 NOTE — H&P ADULT - HISTORY OF PRESENT ILLNESS
The patient is a 63 yo m with PMHx of stroke with unknown origin on eliquis last dose > 25 hours before who presented with sudden onset of left sided weakness. He woke up at 330 am when he was at his baseline, he goes back to sleep when he woke up at 7 am he felt his left side is weak and he has a facial droop. No visual disturbances. no diplopia.  He is within the window for tPA, and he missed the last night dose of Eliquis, PT/PTT/INR are WNL. Risks and benefits of treatment were explained. He is agreeable with the tPA treatment. The patient is a 63 yo m with PMHx of stroke with unknown origin on eliquis last dose > 25 hours before who presented with sudden onset of left sided weakness. He woke up at 330 am when he was at his baseline, he goes back to sleep when he woke up at 7 am he felt his left side is weak and he has a facial droop. No visual disturbances. no diplopia.  He is within the window for tPA, and he missed the last night dose of Eliquis, PT/PTT/INR are WNL. Risks and benefits of treatment were explained. He is agreeable with the tPA treatment. Patient is not sure why he is on Eliquis, states it may be due to multiple TIAs in the past, reported he may have had paroxysmal Afib but is not sure.     Bolus of 9mg recieved at 7:13am which was followed by a 81mg drip.    NIHSS-3, MRS-0

## 2018-10-16 NOTE — DISCHARGE NOTE ADULT - NS AS DC STROKE ED MATERIALS
Stroke Education Booklet/Call 911 for Stroke/Prescribed Medications/Risk Factors for Stroke/Stroke Warning Signs and Symptoms/Need for Followup After Discharge

## 2018-10-16 NOTE — PROCEDURE NOTE - NSPROCDETAILS_GEN_ALL_CORE
connected to a pressurized flush line/positive blood return obtained via catheter/sutured in place/all materials/supplies accounted for at end of procedure/location identified, draped/prepped, sterile technique used, needle inserted/introduced/hemostasis with direct pressure, dressing applied

## 2018-10-16 NOTE — H&P ADULT - NSHPLABSRESULTS_GEN_ALL_CORE
Noncontrast CT brain: There is no evidence of acute intracranial   hemorrhage, extra-axial collection, vasogenic edema, mass effect, midline   shift, central herniation, hydrocephalus or transcortical infarct.     Chronic right MCA territory infarct.    CT angiography neck: No evidence of hemodynamically significant stenosis   using NASCET criteria.  Patent vertebral arteries. Atherosclerotic   disease as above    CT angiography brain: No major vessel occlusion or proximal stenosis.

## 2018-10-16 NOTE — ED PROVIDER NOTE - OBJECTIVE STATEMENT
61 y/o male hx of HTN and TIA In the past, on ASA p/w L leg and arm weakness. States he woke up at 3:15 am, was able to walk to bathroom unassisted w/o any weakness. Woke up at 5:00 am and had weakness. Presents to ED at 6:45 am - code stroke called.

## 2018-10-16 NOTE — ED ADULT NURSE REASSESSMENT NOTE - NS ED NURSE REASSESS COMMENT FT1
Pt. has no complaints at this time. Continuing to monitor and assess at bedside. Safety and comfort provided. Awaiting bed assignment.

## 2018-10-16 NOTE — PROCEDURE NOTE - NSPOSTCAREGUIDE_GEN_A_CORE
Keep the cast/splint/dressing clean and dry/Instructed patient/caregiver to follow-up with primary care physician/Care for catheter as per unit/ICU protocols/Instructed patient/caregiver regarding signs and symptoms of infection/Verbal/written post procedure instructions were given to patient/caregiver

## 2018-10-16 NOTE — H&P ADULT - ATTENDING COMMENTS
VASCULAR NEUROLOGY ATTENDING  The patient is seen and examined the history and imaging are reviewed. I agree with the resident note unless otherwise noted. Patient with history of stroke and known AF on Eliquis. Missed dose last night, this AM awoke normal then developed L sided 4/5. Treated with IVtpa per protocol (risk of IVtpa and anticoagulant use explained to patient). Currently at baseline. Suspect aborted R hemispheric infarct. Will continue post TPA protocol. MRI/A restart AC when able. Compliance encouraged. Outpatient neurologic and cardiac follow-up.

## 2018-10-16 NOTE — DISCHARGE NOTE ADULT - PATIENT PORTAL LINK FT
You can access the [facility], offered by [facility], by registering with the following website: [email or web address]

## 2018-10-16 NOTE — ED ADULT NURSE NOTE - CHPI ED NUR SYMPTOMS NEG
no fever/no confusion/no blurred vision/no loss of consciousness/no nausea/no vomiting/no change in level of consciousness/no dizziness

## 2018-10-16 NOTE — CONSULT NOTE ADULT - SUBJECTIVE AND OBJECTIVE BOX
p ([id number])     HPI:  Rolando Hurtado, 63 yo m with PMHx of stroke with sudden onset left sided weakness early this am, on elliquis now sp tpa.  Decline in neuroexam.  CTH showed right pontine heme. 4th open, prepontine cistern open.     PAST MEDICAL HISTORY   Renal calculus  Atrial fibrillation, unspecified type  Transient cerebral ischemia, unspecified type  Type 2 diabetes mellitus without complication, without long-term current use of insulin  Borderline diabetes mellitus  HLD (hyperlipidemia)  HTN (hypertension)  CAD (coronary artery disease)    PAST SURGICAL HISTORY   Status post laser lithotripsy of ureteral calculus  S/P coronary artery bypass graft x 3        MEDICATIONS:  Antibiotics:    Neuro:    Anticoagulation:    Other:  atorvastatin 80 milliGRAM(s) Oral at bedtime  dextrose 40% Gel 15 Gram(s) Oral once PRN  dextrose 5%. 1000 milliLiter(s) IV Continuous <Continuous>  dextrose 50% Injectable 12.5 Gram(s) IV Push once  dextrose 50% Injectable 25 Gram(s) IV Push once  dextrose 50% Injectable 25 Gram(s) IV Push once  folic acid 1 milliGRAM(s) Oral daily  glucagon  Injectable 1 milliGRAM(s) IntraMuscular once PRN  insulin lispro (HumaLOG) corrective regimen sliding scale   SubCutaneous three times a day before meals  multivitamin 1 Tablet(s) Oral daily  sodium chloride 0.9%. 1000 milliLiter(s) IV Continuous <Continuous>      SOCIAL HISTORY:   Occupation:   Marital Status:     FAMILY HISTORY:  Family history of stroke (Father)      REVIEW OF SYSTEMS:  Check here if all are normal other than Neurological/HPI [x]  General:  Eyes:  ENT:  Cardiac:  Respiratory:  GI:  Musculoskeletal:   Skin:  Neurologic:   Psychiatric:     PHYSICAL EXAMINATION:   T(C): 36.6 (10-16-18 @ 08:59), Max: 36.8 (10-16-18 @ 07:29)  HR: 76 (10-16-18 @ 12:30) (74 - 89)  BP: 136/91 (10-16-18 @ 12:30) (125/81 - 144/88)  RR: 17 (10-16-18 @ 12:30) (14 - 22)  SpO2: 100% (10-16-18 @ 12:30) (96% - 100%)  Wt(kg): --  Weight (kg): 105.4 (10-16 @ 07:15)    General Examination:     Neurologic Examination:           EO to stim, fc, left plegic, perrl    LABS:                        13.7   6.2   )-----------( 300      ( 16 Oct 2018 06:53 )             40.5     10-16    141  |  104  |  15  ----------------------------<  109<H>  4.1   |  25  |  1.37<H>    Ca    9.1      16 Oct 2018 06:53    TPro  7.3  /  Alb  4.2  /  TBili  0.3  /  DBili  x   /  AST  20  /  ALT  17  /  AlkPhos  91  10-16    PT/INR - ( 16 Oct 2018 06:53 )   PT: 10.0 sec;   INR: 0.93 ratio         PTT - ( 16 Oct 2018 06:53 )  PTT:33.8 sec      RADIOLOGY & ADDITIONAL STUDIES:

## 2018-10-16 NOTE — ED ADULT NURSE NOTE - PMH
Atrial fibrillation, unspecified type    CAD (coronary artery disease)    HLD (hyperlipidemia)    HTN (hypertension)    Renal calculus    Transient cerebral ischemia, unspecified type    Type 2 diabetes mellitus without complication, without long-term current use of insulin

## 2018-10-16 NOTE — CONSULT NOTE ADULT - ASSESSMENT
Rolando Hurtado, 61 yo m with PMHx of stroke with sudden onset left sided weakness early this am, on elliquis now sp tpa.  Decline in neuroexam.  CTH showed right pontine heme. 4th open, prepontine cistern open.     PLAN  nscu, repeat cth am, neurochecks, 10 cryo, 2 ffp, 2 plt Rolandovane Hurtado, 63 yo m with PMHx of stroke with sudden onset left sided weakness early this am, on elliquis now sp tpa.  Decline in neuroexam.  CTH showed right pontine heme. 4th open, prepontine cistern open.     PLAN  nscu, repeat cth am, neurochecks, 10 cryo, 2 ffp, 2 plt, ddavp, MRI/MRA

## 2018-10-16 NOTE — PROGRESS NOTE ADULT - SUBJECTIVE AND OBJECTIVE BOX
HPI: The patient is a 61 yo m with PMHx of stroke with unknown origin on eliquis last dose > 25 hours before who presented with sudden onset of left sided weakness. He woke up at 330 am when he was at his baseline, he goes back to sleep when he woke up at 7 am he felt his left side is weak and he has a facial droop. No visual disturbances. no diplopia.  He is within the window for tPA, and he missed the last night dose of Eliquis, PT/PTT/INR are WNL. Risks and benefits of treatment were explained. He is agreeable with the tPA treatment. Patient is not sure why he is on Eliquis, states it may be due to multiple TIAs in the past, reported he may have had paroxysmal Afib but is not sure.     Bolus of 9mg recieved at 7:13am which was followed by a 81mg drip.    NIHSS-3, MRS-0 (16 Oct 2018 07:55)    On admission, the patient was:  GCS:   Jaramillo-Gutierrez:  modified Cotto:  ICH score:  NIHSS:    *** HIGH RISK OF DVT PRESENT ON ADMISSION ***    VITALS:  T(C): , Max: 36.8 (10-16-18 @ 07:29)  HR:  (74 - 104)  BP:  (125/81 - 178/92)  ABP:  (124/63 - 124/63)  RR:  (14 - 22)  SpO2:  (96% - 100%)  Wt(kg): --  Device: Avea, Mode: AC/ CMV (Assist Control/ Continuous Mandatory Ventilation), RR (machine): 14, TV (machine): 500, FiO2: 40, PEEP: 5, ITime: 1, MAP: 8, PIP: 15    LABS:  Na: 140 (10-16 @ 16:14), 141 (10-16 @ 06:53)  K: 3.7 (10-16 @ 16:14), 4.1 (10-16 @ 06:53)  Cl: 103 (10-16 @ 16:14), 104 (10-16 @ 06:53)  CO2: 22 (10-16 @ 16:14), 25 (10-16 @ 06:53)  BUN: 14 (10-16 @ 16:14), 15 (10-16 @ 06:53)  Cr: 1.18 (10-16 @ 16:14), 1.37 (10-16 @ 06:53)  Glu: 198(10-16 @ 16:14), 109(10-16 @ 06:53)    Hgb: 11.7 (10-16 @ 16:14), 13.7 (10-16 @ 06:53)  Hct: 34.2 (10-16 @ 16:14), 40.5 (10-16 @ 06:53)  WBC: 9.4 (10-16 @ 16:14), 6.2 (10-16 @ 06:53)  Plt: 261 (10-16 @ 16:14), 300 (10-16 @ 06:53)  PT: 15.4 (10-16 @ 16:14)  INR: 1.40 (10-16 @ 16:14)  aPTT: 30.8 (10-16 @ 16:14), PT: 10.0 (10-16 @ 06:53)  INR: 0.93 (10-16 @ 06:53)  aPTT: 33.8 (10-16 @ 06:53)    IMAGING:   Recent imaging studies were reviewed.    MEDICATIONS:  atorvastatin 80 milliGRAM(s) Oral at bedtime  dextrose 40% Gel 15 Gram(s) Oral once PRN  dextrose 5%. 1000 milliLiter(s) IV Continuous <Continuous>  dextrose 50% Injectable 12.5 Gram(s) IV Push once  dextrose 50% Injectable 25 Gram(s) IV Push once  dextrose 50% Injectable 25 Gram(s) IV Push once  folic acid 1 milliGRAM(s) Oral daily  glucagon  Injectable 1 milliGRAM(s) IntraMuscular once PRN  insulin lispro (HumaLOG) corrective regimen sliding scale   SubCutaneous three times a day before meals  multivitamin 1 Tablet(s) Oral daily  sodium chloride 0.9%. 1000 milliLiter(s) IV Continuous <Continuous>    EXAMINATION:  General:  Intubated   HEENT:  MMM. Pinpoint pupils - not reactive.   Neuro:  Comatose. No corneals. +Cough/Gag. B/l UE - extensor posturing. B/l LE - TF.  Cards:  RRR  Respiratory:  no respiratory distress  Adomen:  Distended.   Extremities:  no edema  Skin:  warm/dry HPI: The patient is a 63 yo m with PMHx of stroke with unknown origin on eliquis last dose > 25 hours before who presented with sudden onset of left sided weakness. He woke up at 330 am when he was at his baseline, he goes back to sleep when he woke up at 7 am he felt his left side is weak and he has a facial droop. No visual disturbances. no diplopia.  He is within the window for tPA, and he missed the last night dose of Eliquis, PT/PTT/INR are WNL. Risks and benefits of treatment were explained. He is agreeable with the tPA treatment. Patient is not sure why he is on Eliquis, states it may be due to multiple TIAs in the past, reported he may have had paroxysmal Afib but is not sure.     Bolus of 9mg recieved at 7:13am which was followed by a 81mg drip.    NIHSS-3, MRS-0 (16 Oct 2018 07:55)    On admission, the patient was:  GCS:   Jaramillo-Gutierrez:  modified Cotto:  ICH score:  NIHSS: 3    *** HIGH RISK OF DVT PRESENT ON ADMISSION ***    VITALS:  T(C): , Max: 36.8 (10-16-18 @ 07:29)  HR:  (74 - 104)  BP:  (125/81 - 178/92)  ABP:  (124/63 - 124/63)  RR:  (14 - 22)  SpO2:  (96% - 100%)  Wt(kg): --  Device: Avea, Mode: AC/ CMV (Assist Control/ Continuous Mandatory Ventilation), RR (machine): 14, TV (machine): 500, FiO2: 40, PEEP: 5, ITime: 1, MAP: 8, PIP: 15    LABS:  Na: 140 (10-16 @ 16:14), 141 (10-16 @ 06:53)  K: 3.7 (10-16 @ 16:14), 4.1 (10-16 @ 06:53)  Cl: 103 (10-16 @ 16:14), 104 (10-16 @ 06:53)  CO2: 22 (10-16 @ 16:14), 25 (10-16 @ 06:53)  BUN: 14 (10-16 @ 16:14), 15 (10-16 @ 06:53)  Cr: 1.18 (10-16 @ 16:14), 1.37 (10-16 @ 06:53)  Glu: 198(10-16 @ 16:14), 109(10-16 @ 06:53)    Hgb: 11.7 (10-16 @ 16:14), 13.7 (10-16 @ 06:53)  Hct: 34.2 (10-16 @ 16:14), 40.5 (10-16 @ 06:53)  WBC: 9.4 (10-16 @ 16:14), 6.2 (10-16 @ 06:53)  Plt: 261 (10-16 @ 16:14), 300 (10-16 @ 06:53)  PT: 15.4 (10-16 @ 16:14)  INR: 1.40 (10-16 @ 16:14)  aPTT: 30.8 (10-16 @ 16:14), PT: 10.0 (10-16 @ 06:53)  INR: 0.93 (10-16 @ 06:53)  aPTT: 33.8 (10-16 @ 06:53)    IMAGING:   Recent imaging studies were reviewed.    MEDICATIONS:  atorvastatin 80 milliGRAM(s) Oral at bedtime  dextrose 40% Gel 15 Gram(s) Oral once PRN  dextrose 5%. 1000 milliLiter(s) IV Continuous <Continuous>  dextrose 50% Injectable 12.5 Gram(s) IV Push once  dextrose 50% Injectable 25 Gram(s) IV Push once  dextrose 50% Injectable 25 Gram(s) IV Push once  folic acid 1 milliGRAM(s) Oral daily  glucagon  Injectable 1 milliGRAM(s) IntraMuscular once PRN  insulin lispro (HumaLOG) corrective regimen sliding scale   SubCutaneous three times a day before meals  multivitamin 1 Tablet(s) Oral daily  sodium chloride 0.9%. 1000 milliLiter(s) IV Continuous <Continuous>    EXAMINATION:  General:  Intubated   HEENT:  MMM. Pinpoint pupils - not reactive.   Neuro:  Comatose. No corneals. +Cough/Gag. B/l UE - extensor posturing. B/l LE - TF.  Cards:  RRR  Respiratory:  no respiratory distress  Adomen:  Distended.   Extremities:  no edema  Skin:  warm/dry

## 2018-10-16 NOTE — CHART NOTE - NSCHARTNOTEFT_GEN_A_CORE
Pt was headed downstairs to CT scan suite, became lethargic, started vomiting. RRT was called. Primary stroke team present at bedside promptly. Pt was admitted to Stroke Unit s/p tPA.    Exam showed a patient with bilious vomiting around his mouth, eyes closed, on lid retraction eyes deviated down and left pupils 3mm and reactive bilaterally., pt able to follow commands, squeezed finger with right hand, raised right leg, unable to move LUE, LLE, when asked to show two fingers, was able to demonstrate with right hand. VFF appeared to be intact.     STAT CTH showed new hemorrhage in the right yasmin and brachium pontis compared with 6:58 AM.      NSx was consulted and recommended urgent tPA reversal with 2 units of cryo and 2 units plasma    Although vitals remained stable, pt was intubated for airway protection given persistent bilious emesis to prevent aspiration.    Pt transported to NSCU bed 10.    Family was nearby and was informed of what transpired and informed of intubation. Pt was headed downstairs to CT scan suite, became lethargic, started vomiting. RRT was called. Primary stroke team present at bedside promptly. Pt was admitted to Stroke Unit s/p tPA.    Exam showed a patient with bilious vomiting around his mouth, eyes closed, on lid retraction eyes deviated down and left pupils 3mm and reactive bilaterally., pt able to follow commands, squeezed finger with right hand, raised right leg, unable to move LUE, LLE, when asked to show two fingers, was able to demonstrate with right hand. VFF appeared to be intact.     STAT CTH showed new hemorrhage in the right yasmin and brachium pontis compared with 6:58 AM.      NSx was consulted and recommended urgent tPA reversal with 2 units of cryo and 2 units plasma, called blood bank to expedite.    Although vitals remained stable, pt was intubated for airway protection given persistent bilious emesis to prevent aspiration. STAT CXR portable ordered.    Pt transported to NSCU bed 10. Signed out to NSCU PA    Family was nearby and was informed of what transpired and informed of intubation. Pt was headed downstairs to CT scan suite, became lethargic, started vomiting. RRT was called. Primary stroke team present at bedside promptly. Pt was admitted to Stroke Unit s/p tPA.    Exam showed a patient with bilious vomiting around his mouth, eyes closed, on lid retraction eyes deviated down and left pupils 3mm and reactive bilaterally., pt able to follow commands, squeezed finger with right hand, raised right leg, unable to move LUE, LLE, when asked to show two fingers, was able to demonstrate with right hand. VFF appeared to be intact.     STAT CTH showed new hemorrhage in the right yasmin and brachium pontis compared with 6:58 AM.      NSx was consulted and recommended urgent tPA reversal with 2 units of cryo and 2 units plasma, called blood bank to expedite.    Although vitals remained stable, pt was intubated for airway protection given persistent bilious emesis to prevent aspiration. STAT CXR portable ordered.    Pt transported to NSCU bed 10. Signed out to NSCU PA    Family was nearby and was informed of what transpired and informed of intubation.    Attending Dr. Alonso aware and saw patient. Pt was headed downstairs to CT scan suite, became lethargic, started vomiting. RRT was called. Primary stroke team present at bedside promptly. Pt was admitted to Stroke Unit s/p tPA.    Exam showed a patient with bilious vomiting around his mouth, eyes closed, on lid retraction eyes deviated down and left pupils 3mm and reactive bilaterally., pt able to follow commands, squeezed finger with right hand, raised right leg, unable to move LUE, LLE, when asked to show two fingers, was able to demonstrate with right hand. VFF appeared to be intact.     STAT CTH showed new hemorrhage in the right yasmin and brachium pontis compared with 6:58 AM.      NSx was consulted and recommended urgent tPA reversal, called blood bank to expedite.    Although vitals remained stable, pt was intubated for airway protection given persistent bilious emesis to prevent aspiration. STAT CXR portable ordered.    Pt transported to NSCU bed 10. Signed out to NSCU PA    Family was nearby and was informed of what transpired and informed of intubation.    Attending Dr. Alonso aware and saw patient. Pt was headed downstairs to CT scan suite, became lethargic, started vomiting. RRT was called. Primary stroke team present at bedside promptly. Pt was admitted to Stroke Unit s/p tPA.    Exam showed a patient with bilious vomiting around his mouth, eyes closed, on lid retraction eyes deviated down and left pupils 3mm and reactive bilaterally., pt able to follow commands, squeezed finger with right hand, raised right leg, unable to move LUE, LLE, when asked to show two fingers, was able to demonstrate with right hand. VFF appeared to be intact.     STAT CTH showed new hemorrhage in the right yasmin and brachium pontis compared with 6:58 AM.      NSx was consulted and recommended urgent tPA reversal, called blood bank to expedite.    Although vitals remained stable, pt was intubated for airway protection given persistent bilious emesis to prevent aspiration. STAT CXR portable ordered.    Pt transported to NSCU bed 10. Signed out to NSCU PA    Family was nearby and was informed of what transpired and informed of intubation.    Attending Dr. Alonso aware and saw patient.    VASCULAR NEUROLOGY ATTENDING  The patient is seen and examined the history and imaging are reviewed. I agree with the resident note unless otherwise noted.  I was physically present at the time of initial deterioration and intubation. Suspect hemorrhagic transformation of presumed R pontine infarct in the setting of IVtpa.  I discussed the situation at length with the patients family. All questions were answered regarding review of TPA risks upon administration, especially in this clinical setting of Eliquis use with recent missed dose.    At 7PM patient was re-evaluated by me. Now with extensor posturing absent pupillary reflexes and pinpoint pupils. Absent corneal. Not overbreathing vent.  Discussed with patients family that overall prognosis for functional recovery is poor given physical exam findings. All questions were answered to the best of my ability.

## 2018-10-16 NOTE — CHART NOTE - NSCHARTNOTEFT_GEN_A_CORE
MAR RAPID RESPONSE NOTE     RRT called for unresponsiveness.  In summary, 63 yo m with PMH CVA, Afib (on eliquis), HTN admitted earlier today with acute onset L sided weakness.  Patient admitted to neuro service, given TPA for suspected CVA.  After TPA, patient with worsening lethargy and started vomiting, was taken to CT scan, RRT called in CT suite due to worsening mental status and lethargy.  At RRT /86 79 21 96% RA.  Patient lethargic but arousable and following commands, however unable to move L side.  Patient began to have bilious vomiting during exam with worsening mental status.  STAT CTH showed new hemorrhage in the right yasmin and brachium pontis compared with earlier.  Patient accepted to neurosurgical ICU.  Decision made to intubate patient prior to transport to ICU given deteriorating mental status and vomiting, concern for inability to protect airway and possible decompensation during transport.  Anesthesia paged and intubated patient.  Patient transported to NS ICU.  Plan endorsed to NS ICU team. Family notified of intubation and transport to ICU.      Jo Kang, PGY-3  [id number] MAR RAPID RESPONSE NOTE     RRT called for unresponsiveness.  In summary, 61 yo m with PMH CVA, Afib (on eliquis), HTN admitted earlier today with acute onset L sided weakness.  Patient admitted to neuro service, given TPA for suspected CVA.  After TPA, patient with worsening lethargy, was taken to CT scan, RRT called in CT suite due to worsening mental status and lethargy.  At RRT /86 79 21 96% RA.  Patient lethargic but arousable and following commands, however unable to move L side.  Patient began to have bilious vomiting during exam with worsening mental status.  STAT CTH showed new hemorrhage in the right yasmin and brachium pontis compared with earlier.  Patient accepted to neurosurgical ICU.  Decision made to intubate patient prior to transport to ICU given deteriorating mental status and vomiting, concern for inability to protect airway and possible decompensation during transport.  Anesthesia paged and intubated patient.  Patient transported to NS ICU.  Plan endorsed to NS ICU team. Family notified of intubation and transport to ICU.      Jo Kang, PGY-3  [id number]

## 2018-10-16 NOTE — ED ADULT NURSE REASSESSMENT NOTE - NS ED NURSE REASSESS COMMENT FT1
Received report from Nely DEJESUS. Met pt. at CT scan. Code stroke called at 0645. TPA decision made at 0654. Dysphagia performed prior to TPA initiated. Neuro team at bedside. Pt. A&Ox4, speaking in full coherent sentences, pt. reports he woke up from sleep around 0330 and was feeling fine, woke up again around 0500 and was experiencing L sided upper and lower extremity weakness. PERRL - 2 mm bilaterally. L sided upper/lower extremity weakness noted. Denies numbness/tingling Received report from Nely DEJESUS. Met pt. at CT scan. Code stroke called at 0645. TPA decision made at 0654. Dysphagia performed prior to TPA initiated. Neuro team at bedside. Pt. A&Ox4, speaking in full coherent sentences, pt. reports he woke up from sleep around 0330 and was feeling fine, woke up again around 0500 and was experiencing L sided upper and lower extremity weakness. PERRL - 2 mm bilaterally. L sided upper/lower extremity weakness noted. Denies numbness/tingling, sensory perception equal and intact. No facial droop noted. No garbled speech noted. Pt. reports he has had multiple TIA's and was placed on Eliquis - Neuro team aware. Pt. denies dizziness, HA, blurry vision, double vision, ABD pain, CP, SOB, fever, and chills. Pt. and family educated by neuro team and RN prior to administration of TPA as per MD order on the side effects and what symptoms to look out for/notify RN right away if symptoms occur. Safety and comfort provided - side rails up, red socks and yellow band applied. RN at bedside. Family at bedside. Received report from Nely DEJESUS. Met pt. at CT scan. Code stroke called at 0645. TPA decision made at 0654. Dysphagia performed prior to TPA initiated. Neuro team at bedside. Pt. A&Ox4, speaking in full coherent sentences, pt. reports he woke up from sleep around 0330 and was feeling fine, woke up again around 0500 and was experiencing L sided upper and lower extremity weakness. PERRL - 2 mm bilaterally. L sided upper/lower extremity weakness noted. Denies numbness/tingling, sensory perception less to L upper and lower extremities. No facial droop noted. No garbled speech noted. Pt. reports he has had multiple TIA's and was placed on Eliquis - Neuro team aware. Pt. denies dizziness, HA, blurry vision, double vision, ABD pain, CP, SOB, fever, and chills. Pt. and family educated by neuro team and RN prior to administration of TPA as per MD order on the side effects and what symptoms to look out for/notify RN right away if symptoms occur. Safety and comfort provided - side rails up, red socks and yellow band applied. RN at bedside. Family at bedside. Received report from Nely DEJESUS. Met pt. at CT scan. Code stroke called at 0645. TPA decision made at 0654. Dysphagia performed prior to TPA initiated. Neuro team at bedside. Pt. A&Ox4, speaking in full coherent sentences, pt. reports he woke up from sleep around 0330 and was feeling fine, woke up again around 0500 and was experiencing L sided upper and lower extremity weakness. PERRL - 2 mm bilaterally. L sided upper/lower extremity weakness noted. Denies numbness/tingling, sensory perception less to L upper and lower extremities. No facial droop noted. No garbled speech noted. Pt. reports he has had multiple TIA's and was placed on Eliquis - Neuro team aware. Pt. denies dizziness, HA, blurry vision, double vision, ABD pain, CP, SOB, fever, and chills. Pt. and family educated by neuro team and RN prior to administration of TPA as per MD order on the side effects and what symptoms to look out for/notify RN right away if symptoms occur. Pt. reports recently had flu shot about 4 days ago and developed rhinorrhea and cough - denies fever, chills, n/v/d. Safety and comfort provided - side rails up, red socks and yellow band applied. RN at bedside. Family at bedside.

## 2018-10-16 NOTE — ED ADULT NURSE NOTE - OBJECTIVE STATEMENT
61 yo M, afib, HTN, HLD came to ED c/o left sided weakness started at 03:30 this morning.  Pt states that he woke up at 03:15 61 yo M, afib, HTN, HLD came to ED c/o left sided weakness started at 03:30 this morning.  Pt states that he woke up at 03:15 and at 03:30 pt had developed sudden onset of left sided weakness. Denies chest pain, back pain, SOB, fevers/chills, n/v/d, lightheadedness, dizziness, changes in urinary or bowel habits.  A&ox4, left sided weakness noted in upper and lower extremities.  Right side +strength and sensation.  Able to answer questions correctly and able to follow commands.  PERRLA.  VSS.  placed on cardiac monitor, and sent to CT scan.  CODE STROKE initiated at 06:45.  Decision for TPA at 06:54.  TPA administered at 07:13.  2 IVs established bilaterally.  Safety and comfort maintained. Will continue to monitor.   Family present at bedside. 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  Pt states that he woke up at 03:15 and at 03:30 pt had developed sudden onset of left sided weakness. Denies chest pain, back pain, SOB, fevers/chills, n/v/d, lightheadedness, dizziness, changes in urinary or bowel habits.  A&ox4, left sided weakness noted in upper and lower extremities.  Right side +strength and sensation.  Able to answer questions correctly and able to follow commands.  PERRLA.  VSS.  placed on cardiac monitor, and sent to CT scan.  CODE STROKE initiated at 06:45.  Decision for TPA at 06:54.  TPA administered at 07:13.  2 IVs established bilaterally.  Safety and comfort maintained. Will continue to monitor.   Family present at bedside.

## 2018-10-16 NOTE — H&P ADULT - ASSESSMENT
The patient is a 61 yo m with PMHx of HTN, HLD, ?Afib on Eliquis, stroke with unknown origin on eliquis last dose > 25 hours before who presented with sudden onset of left sided weakness. He woke up at 330 am when he was at his baseline, he goes back to sleep when he woke up at 7 am he felt his left side is weak and he has a facial droop. He is within the window for tPA, and he missed the last night dose of Eliquis (last Eliquis dose taken 10/15 6am), PT/PTT/INR are WNL.     Bolus of 9mg received at 7:13am which was followed by a 81mg drip.    Patient most likely has right hemispheric dysfunction    Plan:  -Admit to stroke unit  -No ASA or Elquis  -frequent neuro checks q2  -c/w Atorvastatin 80mg (initially on Crestor 20)  -HbA1c, Lipid panel  -Blood pressure control s/p tpa (<180/105) (BP meds on hold)  -CTH in 24 hours (7am)  -MRI brain w/o contrast  -TTE  -PT/OT/SS  -NPO; dysphagia screen The patient is a 61 yo m with PMHx of HTN, HLD, ?Afib on Eliquis, stroke with unknown origin on eliquis last dose > 25 hours before who presented with sudden onset of left sided weakness. He woke up at 330 am when he was at his baseline, he goes back to sleep when he woke up at 7 am he felt his left side is weak and he has a facial droop. He is within the window for tPA, and he missed the last night dose of Eliquis (last Eliquis dose taken 10/15 6am), PT/PTT/INR are WNL. CTH negative. NIHSS-3, MRS-0    Bolus of 9mg received at 7:13am which was followed by a 81mg drip.    Patient most likely has right hemispheric dysfunction    Plan:  -Admit to stroke unit  -No ASA or Elquis  -frequent neuro checks q2  -c/w Atorvastatin 80mg (initially on Crestor 20)  -HbA1c, Lipid panel  -Blood pressure control s/p tpa (<180/105) (BP meds on hold)  -CTH in 24 hours (7am)  -MRI brain w/o contrast  -TTE  -PT/OT/SS  -NPO; dysphagia screen

## 2018-10-16 NOTE — CHART NOTE - NSCHARTNOTEFT_GEN_A_CORE
Called to patient's bedside for intubation.  Therapy initiated by primary medical team.    Patient Assessment: Pt in CT scan s/p acute stroke.  Decompensated, minimally responsive, actively vomiting on arrival.  Rapid Sequence with Propofol 100mg/Succinylcholine 120mg + cricoid pressure.    Baseline Vital Signs:  BP: 136/54  HR: 107  RR: 22  SpO2: 98    Intubation:      [ x] Direct Laryngoscopy x 1   [ ] Video-Assisted Laryngoscopy   [ ] Fiberoptic Intubation    Blade: MAC#4  Cormack-Lehane View: [x ] 1     [ ] 2A     [ ] 2B     [ ] 3     [ ]  4  ETT Size: 8.0  Marking at Teeth: 22cm    Post-Intubation Vital Signs:  BP: 187/90  HR:105  RR: 0/16  SpO2: 100%    Positive end-tidal carbon dioxide via EasyCap, positive bilateral breath sounds.  CXR to be reviewed by primary team.  Atraumatic intubation with no complications.

## 2018-10-16 NOTE — ED ADULT NURSE REASSESSMENT NOTE - NS ED NURSE REASSESS COMMENT FT1
TPA infusion as per MD order completed. Pt. denies HA, blurry vision, double vision, CP, SOB, ABD pain, numbness/tingling sensation. Pt. A&Ox4, speaking in full coherent sentences, reports weakness to L side is improving. Pt. reports slight sensory deficit to L side. Will continue to monitor at bedside.

## 2018-10-16 NOTE — ED ADULT NURSE REASSESSMENT NOTE - NS ED NURSE REASSESS COMMENT FT1
Receiving RN Mateo from stroke unit at bedside receiving report. Pt. transported with EDT and RN from stroke unit on portable CM and family.

## 2018-10-16 NOTE — ED PROVIDER NOTE - ATTENDING CONTRIBUTION TO CARE
62 yom prior TIA and htn, present w left sided facial weakness along w left arm and left leg weakness. states went to bed normally last night, then awoke at 0300 normally as well - ambulated to and from the bathroom and felt well. states this AM awoke at 0500 w left facial droop/ left arm/ leg weakness. no headache, no falls or trauma. no cp or sob. states was having somwehat of a difficult time walking due to leg weakness.     ROS:   constitutional - no fever, no chills  eyes - no visual changes, no redness  eent - no sore throat, no nasal congestion  cvs - no chest pain, no leg swelling  resp - no shortness of breath, no cough  gi - no abdominal pain, no vomiting, no diarrhea  gu - no dysuria, no hematuria  msk - no acute back pain, no joint swelling  skin - no rashes, no jaundice  neuro - no headache, + focal weakness  psych - no acute mental health issue     Physical Exam:   constitutional - well appearing, awake and alert, oriented x3  head - no external evidence of trauma  cvs - rrr, no murmurs, no peripheral edema  resp - breath sounds clear and equal bilat  gi - abdomen soft and nontender, no rigidity, guarding or rebound, bowel sounds present  msk - moving all extremities spontaneously  neuro - alert and oriented x3, very minimal left facial droop. mild left pronator drift. LUE 4.5/5, LLE also 4.5/5.   skin- no jaundice, warm and dry  psych - mood and affect wnl, no apparent risk to self or others     pt w/i window - code stroke called, tpa given in consultation w neurology. admitted to neurology service for ongoing evaluation. DIMITRIOS Fernandez MD 62 yom prior TIA and htn, present w left sided facial weakness along w left arm and left leg weakness. states went to bed normally last night, then awoke at 0300 normally as well - ambulated to and from the bathroom and felt well. states this AM awoke at 0500 w left facial droop/ left arm/ leg weakness. no headache, no falls or trauma. no cp or sob. states was having somwehat of a difficult time walking due to leg weakness. states is on asa, denies use of other blood thinners on presentation.    ROS:   constitutional - no fever, no chills  eyes - no visual changes, no redness  eent - no sore throat, no nasal congestion  cvs - no chest pain, no leg swelling  resp - no shortness of breath, no cough  gi - no abdominal pain, no vomiting, no diarrhea  gu - no dysuria, no hematuria  msk - no acute back pain, no joint swelling  skin - no rashes, no jaundice  neuro - no headache, + focal weakness  psych - no acute mental health issue     Physical Exam:   constitutional - well appearing, awake and alert, oriented x3  head - no external evidence of trauma  cvs - rrr, no murmurs, no peripheral edema  resp - breath sounds clear and equal bilat  gi - abdomen soft and nontender, no rigidity, guarding or rebound, bowel sounds present  msk - moving all extremities spontaneously  neuro - alert and oriented x3, very minimal left facial droop. mild left pronator drift. LUE 4.5/5, LLE also 4.5/5.   skin- no jaundice, warm and dry  psych - mood and affect wnl, no apparent risk to self or others     pt w/i window - code stroke called, tpa given at discretion of neurology. admitted to neurology service for ongoing evaluation. DIMITRIOS Fernandez MD

## 2018-10-17 DIAGNOSIS — Z71.89 OTHER SPECIFIED COUNSELING: ICD-10-CM

## 2018-10-17 DIAGNOSIS — J96.00 ACUTE RESPIRATORY FAILURE, UNSPECIFIED WHETHER WITH HYPOXIA OR HYPERCAPNIA: ICD-10-CM

## 2018-10-17 DIAGNOSIS — Z51.5 ENCOUNTER FOR PALLIATIVE CARE: ICD-10-CM

## 2018-10-17 DIAGNOSIS — I61.3 NONTRAUMATIC INTRACEREBRAL HEMORRHAGE IN BRAIN STEM: ICD-10-CM

## 2018-10-17 DIAGNOSIS — G93.40 ENCEPHALOPATHY, UNSPECIFIED: ICD-10-CM

## 2018-10-17 LAB
ANION GAP SERPL CALC-SCNC: 15 MMOL/L — SIGNIFICANT CHANGE UP (ref 5–17)
APTT BLD: 27.7 SEC — SIGNIFICANT CHANGE UP (ref 27.5–37.4)
BUN SERPL-MCNC: 12 MG/DL — SIGNIFICANT CHANGE UP (ref 7–23)
CALCIUM SERPL-MCNC: 8.4 MG/DL — SIGNIFICANT CHANGE UP (ref 8.4–10.5)
CHLORIDE SERPL-SCNC: 105 MMOL/L — SIGNIFICANT CHANGE UP (ref 96–108)
CHOLEST SERPL-MCNC: 88 MG/DL — SIGNIFICANT CHANGE UP (ref 10–199)
CO2 SERPL-SCNC: 23 MMOL/L — SIGNIFICANT CHANGE UP (ref 22–31)
CREAT SERPL-MCNC: 1.07 MG/DL — SIGNIFICANT CHANGE UP (ref 0.5–1.3)
GLUCOSE SERPL-MCNC: 133 MG/DL — HIGH (ref 70–99)
HDLC SERPL-MCNC: 44 MG/DL — SIGNIFICANT CHANGE UP
INR BLD: 1.17 RATIO — HIGH (ref 0.88–1.16)
LIPID PNL WITH DIRECT LDL SERPL: 27 MG/DL — SIGNIFICANT CHANGE UP
MAGNESIUM SERPL-MCNC: 2 MG/DL — SIGNIFICANT CHANGE UP (ref 1.6–2.6)
PHOSPHATE SERPL-MCNC: 2.5 MG/DL — SIGNIFICANT CHANGE UP (ref 2.5–4.5)
POTASSIUM SERPL-MCNC: 3.8 MMOL/L — SIGNIFICANT CHANGE UP (ref 3.5–5.3)
POTASSIUM SERPL-SCNC: 3.8 MMOL/L — SIGNIFICANT CHANGE UP (ref 3.5–5.3)
PROTHROM AB SERPL-ACNC: 12.8 SEC — HIGH (ref 9.8–12.7)
SODIUM SERPL-SCNC: 143 MMOL/L — SIGNIFICANT CHANGE UP (ref 135–145)
TOTAL CHOLESTEROL/HDL RATIO MEASUREMENT: 2 RATIO — LOW (ref 3.4–9.6)
TRIGL SERPL-MCNC: 83 MG/DL — SIGNIFICANT CHANGE UP (ref 10–149)

## 2018-10-17 PROCEDURE — 99291 CRITICAL CARE FIRST HOUR: CPT

## 2018-10-17 PROCEDURE — 99232 SBSQ HOSP IP/OBS MODERATE 35: CPT

## 2018-10-17 PROCEDURE — 99223 1ST HOSP IP/OBS HIGH 75: CPT

## 2018-10-17 PROCEDURE — 70450 CT HEAD/BRAIN W/O DYE: CPT | Mod: 26

## 2018-10-17 PROCEDURE — 71045 X-RAY EXAM CHEST 1 VIEW: CPT | Mod: 26

## 2018-10-17 RX ORDER — ONDANSETRON 8 MG/1
4 TABLET, FILM COATED ORAL ONCE
Qty: 0 | Refills: 0 | Status: COMPLETED | OUTPATIENT
Start: 2018-10-17 | End: 2018-10-17

## 2018-10-17 RX ORDER — LABETALOL HCL 100 MG
0.5 TABLET ORAL
Qty: 400 | Refills: 0 | Status: DISCONTINUED | OUTPATIENT
Start: 2018-10-17 | End: 2018-10-18

## 2018-10-17 RX ORDER — METOCLOPRAMIDE HCL 10 MG
10 TABLET ORAL ONCE
Qty: 0 | Refills: 0 | Status: COMPLETED | OUTPATIENT
Start: 2018-10-17 | End: 2018-10-17

## 2018-10-17 RX ORDER — ONDANSETRON 8 MG/1
4 TABLET, FILM COATED ORAL ONCE
Qty: 0 | Refills: 0 | Status: COMPLETED | OUTPATIENT
Start: 2018-10-17 | End: 2018-10-16

## 2018-10-17 RX ORDER — HALOPERIDOL DECANOATE 100 MG/ML
0.5 INJECTION INTRAMUSCULAR EVERY 4 HOURS
Qty: 0 | Refills: 0 | Status: DISCONTINUED | OUTPATIENT
Start: 2018-10-17 | End: 2018-10-17

## 2018-10-17 RX ORDER — FENTANYL CITRATE 50 UG/ML
25 INJECTION INTRAVENOUS
Qty: 0 | Refills: 0 | Status: DISCONTINUED | OUTPATIENT
Start: 2018-10-17 | End: 2018-10-17

## 2018-10-17 RX ORDER — MORPHINE SULFATE 50 MG/1
2 CAPSULE, EXTENDED RELEASE ORAL
Qty: 0 | Refills: 0 | Status: DISCONTINUED | OUTPATIENT
Start: 2018-10-17 | End: 2018-10-19

## 2018-10-17 RX ORDER — NICARDIPINE HYDROCHLORIDE 30 MG/1
5 CAPSULE, EXTENDED RELEASE ORAL
Qty: 40 | Refills: 0 | Status: DISCONTINUED | OUTPATIENT
Start: 2018-10-17 | End: 2018-10-17

## 2018-10-17 RX ORDER — INSULIN LISPRO 100/ML
VIAL (ML) SUBCUTANEOUS EVERY 6 HOURS
Qty: 0 | Refills: 0 | Status: DISCONTINUED | OUTPATIENT
Start: 2018-10-17 | End: 2018-10-17

## 2018-10-17 RX ORDER — HALOPERIDOL DECANOATE 100 MG/ML
1 INJECTION INTRAMUSCULAR EVERY 4 HOURS
Qty: 0 | Refills: 0 | Status: DISCONTINUED | OUTPATIENT
Start: 2018-10-17 | End: 2018-10-18

## 2018-10-17 RX ORDER — SODIUM CHLORIDE 5 G/100ML
1000 INJECTION, SOLUTION INTRAVENOUS
Qty: 0 | Refills: 0 | Status: DISCONTINUED | OUTPATIENT
Start: 2018-10-17 | End: 2018-10-17

## 2018-10-17 RX ORDER — MAGNESIUM SULFATE 500 MG/ML
1 VIAL (ML) INJECTION ONCE
Qty: 0 | Refills: 0 | Status: COMPLETED | OUTPATIENT
Start: 2018-10-17 | End: 2018-10-17

## 2018-10-17 RX ORDER — CHLORHEXIDINE GLUCONATE 213 G/1000ML
1 SOLUTION TOPICAL
Qty: 0 | Refills: 0 | Status: DISCONTINUED | OUTPATIENT
Start: 2018-10-17 | End: 2018-10-17

## 2018-10-17 RX ORDER — CHLORPROMAZINE HCL 10 MG
25 TABLET ORAL ONCE
Qty: 0 | Refills: 0 | Status: COMPLETED | OUTPATIENT
Start: 2018-10-17 | End: 2018-10-17

## 2018-10-17 RX ORDER — CEFAZOLIN SODIUM 1 G
2000 VIAL (EA) INJECTION ONCE
Qty: 0 | Refills: 0 | Status: COMPLETED | OUTPATIENT
Start: 2018-10-17 | End: 2018-10-16

## 2018-10-17 RX ORDER — ROBINUL 0.2 MG/ML
0.4 INJECTION INTRAMUSCULAR; INTRAVENOUS EVERY 6 HOURS
Qty: 0 | Refills: 0 | Status: DISCONTINUED | OUTPATIENT
Start: 2018-10-17 | End: 2018-10-19

## 2018-10-17 RX ORDER — ONDANSETRON 8 MG/1
4 TABLET, FILM COATED ORAL EVERY 6 HOURS
Qty: 0 | Refills: 0 | Status: DISCONTINUED | OUTPATIENT
Start: 2018-10-17 | End: 2018-10-19

## 2018-10-17 RX ADMIN — NICARDIPINE HYDROCHLORIDE 25 MG/HR: 30 CAPSULE, EXTENDED RELEASE ORAL at 05:41

## 2018-10-17 RX ADMIN — ONDANSETRON 4 MILLIGRAM(S): 8 TABLET, FILM COATED ORAL at 05:00

## 2018-10-17 RX ADMIN — HALOPERIDOL DECANOATE 1 MILLIGRAM(S): 100 INJECTION INTRAMUSCULAR at 23:54

## 2018-10-17 RX ADMIN — ONDANSETRON 4 MILLIGRAM(S): 8 TABLET, FILM COATED ORAL at 09:00

## 2018-10-17 RX ADMIN — CHLORHEXIDINE GLUCONATE 15 MILLILITER(S): 213 SOLUTION TOPICAL at 05:41

## 2018-10-17 RX ADMIN — Medication 100 GRAM(S): at 00:30

## 2018-10-17 RX ADMIN — SODIUM CHLORIDE 75 MILLILITER(S): 5 INJECTION, SOLUTION INTRAVENOUS at 21:31

## 2018-10-17 RX ADMIN — Medication 102 MILLIGRAM(S): at 17:18

## 2018-10-17 RX ADMIN — CHLORHEXIDINE GLUCONATE 15 MILLILITER(S): 213 SOLUTION TOPICAL at 17:46

## 2018-10-17 RX ADMIN — DEXMEDETOMIDINE HYDROCHLORIDE IN 0.9% SODIUM CHLORIDE 5.27 MICROGRAM(S)/KG/HR: 4 INJECTION INTRAVENOUS at 21:36

## 2018-10-17 RX ADMIN — PANTOPRAZOLE SODIUM 40 MILLIGRAM(S): 20 TABLET, DELAYED RELEASE ORAL at 12:36

## 2018-10-17 RX ADMIN — ONDANSETRON 4 MILLIGRAM(S): 8 TABLET, FILM COATED ORAL at 01:00

## 2018-10-17 RX ADMIN — Medication 10 MILLIGRAM(S): at 13:18

## 2018-10-17 RX ADMIN — ONDANSETRON 4 MILLIGRAM(S): 8 TABLET, FILM COATED ORAL at 22:32

## 2018-10-17 RX ADMIN — DEXMEDETOMIDINE HYDROCHLORIDE IN 0.9% SODIUM CHLORIDE 5.27 MICROGRAM(S)/KG/HR: 4 INJECTION INTRAVENOUS at 05:41

## 2018-10-17 RX ADMIN — Medication 30 MG/MIN: at 21:31

## 2018-10-17 RX ADMIN — DEXTROSE MONOHYDRATE, SODIUM CHLORIDE, AND POTASSIUM CHLORIDE 75 MILLILITER(S): 50; .745; 4.5 INJECTION, SOLUTION INTRAVENOUS at 05:42

## 2018-10-17 RX ADMIN — SODIUM CHLORIDE 75 MILLILITER(S): 5 INJECTION, SOLUTION INTRAVENOUS at 09:55

## 2018-10-17 RX ADMIN — ROBINUL 0.4 MILLIGRAM(S): 0.2 INJECTION INTRAMUSCULAR; INTRAVENOUS at 23:53

## 2018-10-17 RX ADMIN — Medication 1: at 05:40

## 2018-10-17 RX ADMIN — NICARDIPINE HYDROCHLORIDE 25 MG/HR: 30 CAPSULE, EXTENDED RELEASE ORAL at 09:55

## 2018-10-17 RX ADMIN — ATORVASTATIN CALCIUM 80 MILLIGRAM(S): 80 TABLET, FILM COATED ORAL at 21:31

## 2018-10-17 NOTE — PROGRESS NOTE ADULT - SUBJECTIVE AND OBJECTIVE BOX
61 yo m with PMHx of stroke with unknown origin on eliquis last dose > 25 hours before who presented with sudden onset of left sided weakness - woke up at 7 am he felt his left side is weak and he has a facial droop. No visual disturbances. no diplopia.  He is within the window for tPA, and he missed the last night dose of Eliquis, PT/PTT/INR are WNL. Risks and benefits of treatment were explained. He is agreeable with the tPA treatment. Patient is not sure why he is on Eliquis, states it may be due to multiple TIAs in the past, reported he may have had paroxysmal Afib but is not sure.   TPA at 7:13am.  Admission scores: NIHSS-3, MRS-0 (16 Oct 2018 07:55)  Hemorrhagic conversion with clinical deterioration, required intubation; received 2 plt, 2 FFPs, 10 U cryoprecipitate. Pontine heme, IVH with hydrocephalus - EVD placed with OP 20-25.    Overnight Events:     ROS: negative [] unable to obtain as patient is comatose/intubated/aphasic []     VITALS:   T(C): 36.5 (10-17-18 @ 03:00), Max: 36.8 (10-16-18 @ 07:29)  HR: 80 (10-17-18 @ 06:30) (59 - 106)  BP: 122/68 (10-16-18 @ 15:20) (122/68 - 178/92)  RR: 18 (10-17-18 @ 06:30) (8 - 23)  SpO2: 97% (10-17-18 @ 06:30) (92% - 100%)    10-16-18 @ 07:01  -  10-17-18 @ 06:46  --------------------------------------------------------  IN: 2696.5 mL / OUT: 2168 mL / NET: 528.5 mL      LABS:  ABG - ( 16 Oct 2018 21:07 )  pH, Arterial: 7.37  pH, Blood: x     /  pCO2: 50    /  pO2: 120   / HCO3: 28    / Base Excess: 2.7   /  SaO2: 98                         11.5   11.5  )-----------( 343      ( 16 Oct 2018 21:30 )             34.2     10-16    140  |  102  |  14  ----------------------------<  133<H>  4.0   |  24  |  1.17    Ca    8.5      16 Oct 2018 21:30  Phos  4.0     10-16  Mg     1.8     10-16    TPro  7.3  /  Alb  4.2  /  TBili  0.3  /  DBili  x   /  AST  20  /  ALT  17  /  AlkPhos  91  10-16    PT/INR - ( 17 Oct 2018 05:16 )   PT: 12.8 sec;   INR: 1.17 ratio         PTT - ( 17 Oct 2018 05:16 )  PTT:27.7 sec  MEDS:  MEDICATIONS  (STANDING):  atorvastatin 80 milliGRAM(s) Oral at bedtime  chlorhexidine 0.12% Liquid 15 milliLiter(s) Oral Mucosa two times a day  dexmedetomidine Infusion 0.2 MICROgram(s)/kG/Hr (5.27 mL/Hr) IV Continuous <Continuous>  dextrose 5%. 1000 milliLiter(s) (50 mL/Hr) IV Continuous <Continuous>  dextrose 50% Injectable 12.5 Gram(s) IV Push once  dextrose 50% Injectable 25 Gram(s) IV Push once  dextrose 50% Injectable 25 Gram(s) IV Push once  folic acid 1 milliGRAM(s) Oral daily  insulin lispro (HumaLOG) corrective regimen sliding scale   SubCutaneous every 6 hours  multivitamin 1 Tablet(s) Oral daily  niCARdipine Infusion 5 mG/Hr (25 mL/Hr) IV Continuous <Continuous>  pantoprazole  Injectable 40 milliGRAM(s) IV Push daily  sodium chloride 2% 1000 milliLiter(s) (75 mL/Hr) IV Continuous <Continuous>      Vitals, labs, images reviewed.   EXAMINATION:  General:  Intubated   HEENT:  MMM. Pinpoint pupils - not reactive.   Neuro:  Comatose. No corneals. +Cough/Gag. B/l UE - extensor posturing. B/l LE - TF.  Cards:  RRR  Respiratory:  no respiratory distress  Adomen:  Distended.   Extremities:  no edema  Skin:  warm/dry 63 yo m with PMHx of stroke with unknown origin on eliquis last dose > 25 hours before who presented with sudden onset of left sided weakness - woke up at 7 am he felt his left side is weak and he has a facial droop. No visual disturbances. no diplopia.  He is within the window for tPA, and he missed the last night dose of Eliquis, PT/PTT/INR are WNL. Risks and benefits of treatment were explained. He is agreeable with the tPA treatment. Patient is not sure why he is on Eliquis, states it may be due to multiple TIAs in the past, reported he may have had paroxysmal Afib but is not sure.   TPA at 7:13am.  Admission scores: NIHSS-3, MRS-0 (16 Oct 2018 07:55)  Hemorrhagic conversion with clinical deterioration, required intubation; received 2 plt, 2 FFPs, 10 U cryoprecipitate. Pontine heme, IVH with hydrocephalus - EVD placed with OP 20-25.    Overnight Events: Neurological exam remains unchanged.     ROS: Unable to obtain since patient is intubated and comatose.    VITALS:   T(C): 36.5 (10-17-18 @ 03:00), Max: 36.8 (10-16-18 @ 07:29)  HR: 80 (10-17-18 @ 06:30) (59 - 106)  BP: 122/68 (10-16-18 @ 15:20) (122/68 - 178/92)  RR: 18 (10-17-18 @ 06:30) (8 - 23)  SpO2: 97% (10-17-18 @ 06:30) (92% - 100%)    10-16-18 @ 07:01  -  10-17-18 @ 06:46  --------------------------------------------------------  IN: 2696.5 mL / OUT: 2168 mL / NET: 528.5 mL      LABS:  ABG - ( 16 Oct 2018 21:07 )  pH, Arterial: 7.37  pH, Blood: x     /  pCO2: 50    /  pO2: 120   / HCO3: 28    / Base Excess: 2.7   /  SaO2: 98                         11.5   11.5  )-----------( 343      ( 16 Oct 2018 21:30 )             34.2     10-16    140  |  102  |  14  ----------------------------<  133<H>  4.0   |  24  |  1.17    Ca    8.5      16 Oct 2018 21:30  Phos  4.0     10-16  Mg     1.8     10-16    TPro  7.3  /  Alb  4.2  /  TBili  0.3  /  DBili  x   /  AST  20  /  ALT  17  /  AlkPhos  91  10-16    PT/INR - ( 17 Oct 2018 05:16 )   PT: 12.8 sec;   INR: 1.17 ratio         PTT - ( 17 Oct 2018 05:16 )  PTT:27.7 sec  MEDS:  MEDICATIONS  (STANDING):  atorvastatin 80 milliGRAM(s) Oral at bedtime  chlorhexidine 0.12% Liquid 15 milliLiter(s) Oral Mucosa two times a day  dexmedetomidine Infusion 0.2 MICROgram(s)/kG/Hr (5.27 mL/Hr) IV Continuous <Continuous>  dextrose 5%. 1000 milliLiter(s) (50 mL/Hr) IV Continuous <Continuous>  dextrose 50% Injectable 12.5 Gram(s) IV Push once  dextrose 50% Injectable 25 Gram(s) IV Push once  dextrose 50% Injectable 25 Gram(s) IV Push once  folic acid 1 milliGRAM(s) Oral daily  insulin lispro (HumaLOG) corrective regimen sliding scale   SubCutaneous every 6 hours  multivitamin 1 Tablet(s) Oral daily  niCARdipine Infusion 5 mG/Hr (25 mL/Hr) IV Continuous <Continuous>  pantoprazole  Injectable 40 milliGRAM(s) IV Push daily  sodium chloride 2% 1000 milliLiter(s) (75 mL/Hr) IV Continuous <Continuous>      Vitals, labs, images reviewed.   EXAMINATION:  General:  Intubated   HEENT:  MMM. Pinpoint pupils - not reactive.   Neuro:  Comatose. No corneals. +Cough/Gag. B/l UE - extensor posturing. B/l LE - TF.  Cards:  RRR  Respiratory:  no respiratory distress  Adomen:  Distended.   Extremities:  no edema  Skin:  warm/dry

## 2018-10-17 NOTE — DISCHARGE NOTE FOR THE EXPIRED PATIENT - HOSPITAL COURSE
63 Y/O male c/o left sided weakness, facial droop, s/p tPA resulting in brainstem hemorrhage/hemorrhagic conversion. Pt brought to PCU for continued care and symptom management given poor neurologic status. Patient underwent palliative extubation on 10/19 with removal of EVD per family request. He  on 10/19 with family at bedside.

## 2018-10-17 NOTE — CONSULT NOTE ADULT - ASSESSMENT
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  D/W Dr. Alonso and neuro staff.

## 2018-10-17 NOTE — PROGRESS NOTE ADULT - ASSESSMENT
62 year old male pmhx of paroxysmal Afib (on Eliquis), CVA and TIA presented to ED w/ L sided weakness. NIHSS 3. Received tPA, and was noted to have worsening of neuro exam.   Pontine hemorrhage with IVH, hydrocephalus.     Plan:  q1h neuro checks  EVD @10  Stroke core measures: A1c, lipid panel  Pain control - Fentanyl PRN  SBP <140  Nicardipine gtt if needed  Hold anticoagulation due to brain bleed    Code status:  Full code  Disposition:  ICU    This patient was at high risk of neurologic deterioration and/or death due to: brain bleed, herniation    Time spent:  35 minutes

## 2018-10-17 NOTE — PROGRESS NOTE ADULT - ASSESSMENT
The patient is a 62 year old man with PMHx of stroke with unknown origin. He presented to ED with left sided facial droop and left sided hemiparesis, unable to ambulate due to deficits. CTH was performed, no acute bleeding was seen in CTH, PT-PTT-INR all were within normal limites. Stroke attending was called, the patient's symptoms were discussed over the phone, risks and benefits of tPA were discussed with the family, the patient understood the risks and he agreed for the tPA since he was unable to ambulate, which would interfere with his occupation. The decision was made to continue with tPA administration. After the bolus, family showed us his medication list, which had Eliquis on it. His last dose of Eliquis was more than 25 hours prior to the presentation. The stroke attending was called and the matter discussed. Given the normal coagulation factors and the last missed dose of Eliquis, as well as neurological deficits, which could have negative impact on his life style. He was deemed to benefit from continuing tPA drip. The patient was made aware that he is at higher risk of bleeding, he agreed to proceed with tPA drip per protocol. Bolus of 9mg received at 7:13am which was followed by a 81mg drip. NIHSS-3, MRS-0    NEURO: On 10/16 patient was headed to CT scan, became lethargic, started vomiting and RRT was called on exam eyes closed, on lid retraction eyes deviated down and left pupils 3mm and reactive bilaterally., pt able to follow commands, squeezed finger with right hand, raised right leg, unable to move LUE, LLE, when asked to show two fingers, was able to demonstrate with right hand, CT scan showed new hemorrhage in the right yasmin and brachium pontis compared to morning CT scan at 6:58am. Neurosurgery was consulted- urgent tPA reversal, received 10 units of cryoprecipitate, 1 unit of platelets, 30 DDAVP and 2 units of plasma, EVD placed with OP 20-25. Patient intubated for airway protection.     DISPOSITION: Patient is at high risk for neurologic deterioration due to hemorrhage and herniation, family at bedside updated with plan. Support provided. Prognosis poor.     CORE MEASURES:        Admission NIHSS: 3     TPA: [x] YES [] NO      LDL/HDL: 27/44     Depression Screen: p     Statin Therapy: n     Dysphagia Screen: [] PASS [x] FAIL     Smoking [] YES [x] NO      Afib [x] YES [] NO     Stroke Education [] YES [] NOp CORE MEASURES:        Admission NIHSS: 3     TPA: [x] YES [] NO      LDL/HDL: 27/44     Depression Screen: p     Statin Therapy: n     Dysphagia Screen: [] PASS [x] FAIL     Smoking [] YES [x] NO      Afib [x] YES [] NO     Stroke Education [] YES [] NOp

## 2018-10-17 NOTE — PROGRESS NOTE ADULT - SUBJECTIVE AND OBJECTIVE BOX
THE PATIENT WAS SEEN AND EXAMINED BY ME WITH THE HOUSESTAFF AND STROKE TEAM DURING MORNING ROUNDS.   HPI:  The patient is a 62 year old man with PMHx of stroke with unknown origin. He presented to ED with left sided facial droop and left sided hemiparesis, unable to ambulate due to deficits. He woke up at 3:30am on 10/16 when he was at his baseline and when he woke up again at 7am symptoms started. He denied being on anticoagulation, or having any heart problems in the past, denied recent bleeding, however expressed he was on antihypertensive medication due to hypertension. CTH was performed, no acute bleeding was seen in CTH, PT-PTT-INR all were within normal limites. Stroke attending was called, the patient's symptoms were discussed over the phone, risks and benefits of tPA were discussed with the family, the patient understood the risks and he agreed for the tPA since he was unable to ambulate, which would interfere with his occupation. The decision was made to continue with tPA administration. After the bolus, family showed us his medication list, which had Eliquis on it. Then he was asked when and how he is taking his medications, he described he did not take any of his meds night of 10/15. His last dose of Eliquis was more than 25 hours prior to the presentation. The stroke attending was called and the matter discussed. Given the normal coagulation factors and the last missed dose of Eliquis, as well as neurological deficits, which could have negative impact on his life style. He was deemed to benefit from continuing tPA drip. The patient was made aware that he is at higher risk of bleeding, he agreed to proceed with tPA drip per protocol. Bolus of 9mg received at 7:13am which was followed by a 81mg drip. NIHSS-3, MRS-0    SUBJECTIVE: No new events overnight.  No new neurologic complaints.      atorvastatin 80 milliGRAM(s) Oral at bedtime  chlorhexidine 0.12% Liquid 15 milliLiter(s) Oral Mucosa two times a day  chlorhexidine 4% Liquid 1 Application(s) Topical <User Schedule>  dexmedetomidine Infusion 0.2 MICROgram(s)/kG/Hr IV Continuous <Continuous>  dextrose 40% Gel 15 Gram(s) Oral once PRN  dextrose 5%. 1000 milliLiter(s) IV Continuous <Continuous>  dextrose 50% Injectable 12.5 Gram(s) IV Push once  dextrose 50% Injectable 25 Gram(s) IV Push once  dextrose 50% Injectable 25 Gram(s) IV Push once  fentaNYL    Injectable 25 MICROGram(s) IV Push every 2 hours PRN  folic acid 1 milliGRAM(s) Oral daily  glucagon  Injectable 1 milliGRAM(s) IntraMuscular once PRN  insulin lispro (HumaLOG) corrective regimen sliding scale   SubCutaneous every 6 hours  multivitamin 1 Tablet(s) Oral daily  niCARdipine Infusion 5 mG/Hr IV Continuous <Continuous>  ondansetron Injectable 4 milliGRAM(s) IV Push every 6 hours PRN  pantoprazole  Injectable 40 milliGRAM(s) IV Push daily  sodium chloride 2% 1000 milliLiter(s) IV Continuous <Continuous>      PHYSICAL EXAM:   Vital Signs Last 24 Hrs  T(C): 36.9 (17 Oct 2018 07:00), Max: 36.9 (17 Oct 2018 07:00)  T(F): 98.5 (17 Oct 2018 07:00), Max: 98.5 (17 Oct 2018 07:00)  HR: 85 (17 Oct 2018 10:30) (59 - 106)  BP: 122/68 (16 Oct 2018 15:20) (122/68 - 178/92)  BP(mean): 84 (16 Oct 2018 15:20) (84 - 115)  RR: 19 (17 Oct 2018 10:30) (8 - 23)  SpO2: 97% (17 Oct 2018 10:30) (92% - 100%)    General: intubated  HEENT: pinpoint pupils, not reactive  Abdomen: distended  Extremities: No edema    NEUROLOGICAL EXAM:  Mental status: comatose, intubated  Cranial Nerves:  No corneal reflex. +Cough/Gag. B/l UE - extensor posturing.  Motor exam:comatose  Sensation: comatose  Coordination/ Gait: comatose    LABS:                        11.5   11.5  )-----------( 343      ( 16 Oct 2018 21:30 )             34.2    10-16    140  |  102  |  14  ----------------------------<  133<H>  4.0   |  24  |  1.17    Ca    8.5      16 Oct 2018 21:30  Phos  4.0     10-16  Mg     1.8     10-16    TPro  7.3  /  Alb  4.2  /  TBili  0.3  /  DBili  x   /  AST  20  /  ALT  17  /  AlkPhos  91  10-16  PT/INR - ( 17 Oct 2018 05:16 )   PT: 12.8 sec;   INR: 1.17 ratio       PTT - ( 17 Oct 2018 05:16 )  PTT:27.7 sec  Hemoglobin A1C, Whole Blood: 5.8 % (10-16 @ 19:15)    IMAGING: Reviewed by me.   CT HEAD (10/17/18):Interval placement of right frontal approach ventriculostomy catheter   which terminates at the foramen of Gomez. Slightly increased small   hemorrhage layering in the occipital horns.    Slightly decreased mild to moderate hydrocephalus.    Similar scattered subarachnoid hemorrhage.    Similar parenchymal hemorrhage in the basis pontis and brachium ponti  CT HEAD (10/16/18):  Extension of the brainstem hemorrhage to the right midbrain.   Progressive ventricular dilatation with hemorrhage layering in the   occipital horns of the lateral ventricles bilaterally. Old right parietal   distribution infarct.  CT HEAD (10/16/18): New hemorrhage in the right yasmin and brachium pontis compared   with 6:58 AM.         (10/16/18): Noncontrast CT brain: There is no evidence of acute intracranial   hemorrhage, extra-axial collection, vasogenic edema, mass effect, midline   shift, central herniation, hydrocephalus or transcortical infarct.     Chronic right MCA territory infarct.    CT angiography neck: No evidence of hemodynamically significant stenosis   using NASCET criteria.  Patent vertebral arteries. Atherosclerotic   disease as above    CT angiography brain: No major vessel occlusion or proximal stenosis. THE PATIENT WAS SEEN AND EXAMINED BY ME WITH THE HOUSESTAFF AND STROKE TEAM DURING MORNING ROUNDS.   HPI:  The patient is a 62 year old man with PMHx of stroke likely related to PAF. On 10/16/18 he presented to ED with left sided facial droop and left sided hemiparesis, unable to ambulate due to deficits. He woke up at 3:30am on 10/16/18 when he was at his baseline (normal) and when he woke up again at about 6am symptoms started. He denied being on anticoagulation, or having any heart problems in the past, denied recent bleeding, however expressed he was on antihypertensive medication. CTH was performed, no acute bleeding was seen in CTH, PT-PTT-INR all were within normal limites. Stroke attending was called, the patient's symptoms were discussed over the phone, risks and benefits of tPA were discussed with the family via neurology housestaff, the patient understood the risks and he agreed for the tPA since he was unable to ambulate this would interfere with his occupation. The decision was start tPA. After the bolus, family showed us his medication list, which had Eliquis on it. Then he was asked when and how he is taking his medications, he described he did not take any of his meds on the night of 10/1518 His last dose of Eliquis was more than 25 hours prior to the presentation. I was called and the matter was discussed. Given the normal coagulation factors and the last missed dose of Eliquis, as well as neurological deficits which could have negative impact on his life style he was deemed to benefit from continuing tPA drip. The patient was made aware that he is at higher risk of bleeding, he agreed to proceed with tPA drip per protocol. Bolus of 9mg received at 7:13am which was followed by a 81mg drip. On 10/16/18 ~2PM patient was headed to CT scan, became lethargic, started vomiting and RRT was called on exam eyes closed, on lid retraction eyes deviated down and left pupils 3mm and reactive bilaterally., pt able to follow commands, squeezed finger with right hand, raised right leg, unable to move LUE, LLE, when asked to show two fingers, was able to demonstrate with right hand, CT scan showed new hemorrhage in the right yasmin and brachium pontis compared to morning CT scan at 6:58am. Neurosurgery was consulted- urgent tPA reversal, received 10 units of cryoprecipitate, 1 unit of platelets, 30 DDAVP and 2 units of plasma, EVD placed with OP 20-25. Patient intubated for airway protection. Repeat CT as noted above. EVD placed overnight with no improvement in clinical condition. Remains unarousable unresponsive.       atorvastatin 80 milliGRAM(s) Oral at bedtime  chlorhexidine 0.12% Liquid 15 milliLiter(s) Oral Mucosa two times a day  chlorhexidine 4% Liquid 1 Application(s) Topical <User Schedule>  dexmedetomidine Infusion 0.2 MICROgram(s)/kG/Hr IV Continuous <Continuous>  dextrose 40% Gel 15 Gram(s) Oral once PRN  dextrose 5%. 1000 milliLiter(s) IV Continuous <Continuous>  dextrose 50% Injectable 12.5 Gram(s) IV Push once  dextrose 50% Injectable 25 Gram(s) IV Push once  dextrose 50% Injectable 25 Gram(s) IV Push once  fentaNYL    Injectable 25 MICROGram(s) IV Push every 2 hours PRN  folic acid 1 milliGRAM(s) Oral daily  glucagon  Injectable 1 milliGRAM(s) IntraMuscular once PRN  insulin lispro (HumaLOG) corrective regimen sliding scale   SubCutaneous every 6 hours  multivitamin 1 Tablet(s) Oral daily  niCARdipine Infusion 5 mG/Hr IV Continuous <Continuous>  ondansetron Injectable 4 milliGRAM(s) IV Push every 6 hours PRN  pantoprazole  Injectable 40 milliGRAM(s) IV Push daily  sodium chloride 2% 1000 milliLiter(s) IV Continuous <Continuous>      PHYSICAL EXAM:   Vital Signs Last 24 Hrs  T(C): 36.9 (17 Oct 2018 07:00), Max: 36.9 (17 Oct 2018 07:00)  T(F): 98.5 (17 Oct 2018 07:00), Max: 98.5 (17 Oct 2018 07:00)  HR: 85 (17 Oct 2018 10:30) (59 - 106)  BP: 122/68 (16 Oct 2018 15:20) (122/68 - 178/92)  BP(mean): 84 (16 Oct 2018 15:20) (84 - 115)  RR: 19 (17 Oct 2018 10:30) (8 - 23)  SpO2: 97% (17 Oct 2018 10:30) (92% - 100%)    General: intubated  HEENT: pinpoint pupils, not reactive  Abdomen: distended  Extremities: No edema    NEUROLOGICAL EXAM:  Mental status: Unarousable unresponsive, intubated, extensor posturing. Not overbreathing vent. Pupils pinpoint non-reactive  Cranial Nerves:  No corneal reflex.  - extensor posturing.      LABS:                        11.5   11.5  )-----------( 343      ( 16 Oct 2018 21:30 )             34.2    10-16    140  |  102  |  14  ----------------------------<  133<H>  4.0   |  24  |  1.17    Ca    8.5      16 Oct 2018 21:30  Phos  4.0     10-16  Mg     1.8     10-16    TPro  7.3  /  Alb  4.2  /  TBili  0.3  /  DBili  x   /  AST  20  /  ALT  17  /  AlkPhos  91  10-16  PT/INR - ( 17 Oct 2018 05:16 )   PT: 12.8 sec;   INR: 1.17 ratio       PTT - ( 17 Oct 2018 05:16 )  PTT:27.7 sec  Hemoglobin A1C, Whole Blood: 5.8 % (10-16 @ 19:15)    IMAGING: Reviewed by me.   CT HEAD (10/17/18):Interval placement of right frontal approach ventriculostomy catheter   which terminates at the foramen of Gomez. Slightly increased small   hemorrhage layering in the occipital horns.    Slightly decreased mild to moderate hydrocephalus.    Similar scattered subarachnoid hemorrhage.    Similar parenchymal hemorrhage in the basis pontis and brachium ponti  CT HEAD (10/16/18):  Extension of the brainstem hemorrhage to the right midbrain.   Progressive ventricular dilatation with hemorrhage layering in the   occipital horns of the lateral ventricles bilaterally. Old right parietal   distribution infarct.  CT HEAD (10/16/18): New hemorrhage in the right yasmin and brachium pontis compared   with 6:58 AM.         (10/16/18): Noncontrast CT brain: There is no evidence of acute intracranial   hemorrhage, extra-axial collection, vasogenic edema, mass effect, midline   shift, central herniation, hydrocephalus or transcortical infarct.     Chronic right MCA territory infarct.    CT angiography neck: No evidence of hemodynamically significant stenosis   using NASCET criteria.  Patent vertebral arteries. Atherosclerotic   disease as above    CT angiography brain: No major vessel occlusion or proximal stenosis.

## 2018-10-17 NOTE — PROGRESS NOTE ADULT - SUBJECTIVE AND OBJECTIVE BOX
61 yo m with PMHx of stroke with unknown origin on eliquis last dose > 25 hours before who presented with sudden onset of left sided weakness - woke up at 7 am he felt his left side is weak and he has a facial droop. No visual disturbances. no diplopia.  He is within the window for tPA, and he missed the last night dose of Eliquis, PT/PTT/INR are WNL. Risks and benefits of treatment were explained. He is agreeable with the tPA treatment. Patient is not sure why he is on Eliquis, states it may be due to multiple TIAs in the past, reported he may have had paroxysmal Afib but is not sure.   TPA at 7:13am.  Admission scores: NIHSS-3, MRS-0 (16 Oct 2018 07:55)  Hemorrhagic conversion with clinical deterioration, required intubation; received 2 plt, 2 FFPs, 10 U cryoprecipitate. Pontine heme, IVH with hydrocephalus - EVD placed with OP 20-25.      Vitals, labs, images reviewed.   EXAMINATION:  General:  Intubated   HEENT:  MMM. Pinpoint pupils - not reactive.   Neuro:  Comatose. No corneals. +Cough/Gag. B/l UE - extensor posturing. B/l LE - TF.  Cards:  RRR  Respiratory:  no respiratory distress  Adomen:  Distended.   Extremities:  no edema  Skin:  warm/dry

## 2018-10-17 NOTE — CHART NOTE - NSCHARTNOTEFT_GEN_A_CORE
In clarification of tPA administration and admission decision;    Patient presented to ED with left sided facial droop and left sided hemiparesis, unable to ambulate due to deficits. He denied being on anticoagulation, or having any heart problems in the past, denied recent bleeding, however expressed he was on antihypertensive medication due to hypertension. CTH was performed, no acute bleeding was seen in CTH, PT-PTT-INR all were within normal limites. Stroke attending was called, the patient's symptoms were discussed over the phone, risks and benefits of tPA were discussed with the family, the patient understood the risks and he agreed for the tPA since he was unable to ambulate, which would interfere with his occupation. The decision was made to continue with tPA administration. After the bolus, family showed us his medication list, which had Eliquis on it. Then he was asked when and how he is taking his medications, he described he did not take any of his meds last night. His last dose of Eliquis was more than 25 hours prior to the presentation. The stroke attending was called and the matter discussed. Given the normal coagulation factors and the last missed dose of Eliquis, as well as neurological deficits, which could have negative impact on his life style. He was deemed to benefit from continuing tPA drip. The patient was made aware that he is at higher risk of bleeding, he agreed to proceed with tPA drip per protocol.

## 2018-10-17 NOTE — PROGRESS NOTE ADULT - ASSESSMENT
Rolando Hurtado, 61 yo m with PMHx of stroke with sudden onset left sided weakness early this am, on elliquis now sp tpa.  Decline in neuroexam.  CTH showed right pontine heme. 4th open, prepontine cistern open. CTH with worsening heme an hydro    PLAN  EVD at 10  Q1hr neurochecks  MRI / MRA when able  trend coags and fibrinogen

## 2018-10-17 NOTE — PROGRESS NOTE ADULT - ASSESSMENT
62 year old male pmhx of paroxysmal Afib (on Eliquis), CVA and TIA presented to ED w/ L sided weakness. NIHSS 3. Received tPA, and was noted to have worsening of neuro exam.   Pontine hemorrhage with IVH, hydrocephalus.     Plan:  q1h neuro checks  EVD @10  Stroke core measures: A1c, lipid panel  Pain control - Fentanyl PRN  SBP <140  Nicardipine gtt if needed  Hold anticoagulation due to brain bleed    Code status:  Full code  Disposition:  ICU    This patient was at high risk of neurologic deterioration and/or death due to: brain bleed, herniation    Time spent:  35 minutes Summary: 62 year old male pmhx of paroxysmal Afib (on Eliquis), CVA and TIA presented to ED w/ L sided weakness. NIHSS 3. Received tPA, and was noted to have worsening of neuro exam. Found to have Pontine hemorrhage with IVH, hydrocephalus. Family decided to pursue comfort measures. Palliative consulted.    NEURO:   Neurochecks q shift  DC EVD  Patient to transfer to PCU          Pain control - Fentanyl PRN  SBP <140  Nicardipine gtt if needed  Hold anticoagulation due to brain bleed      CARDS:  -150    PULM:  sat > 92%    RENAL:  IVL    GASTRO:  advance as tolerated  ---> Stress ulcer prophylaxis:  PPI    HEME:  monitor H/H    ---> DVT prophylaxis: SCDs, hold anticoagulation, fresh    ENDO:  euglycemia    ID:  afebrile    Code status:  Full code  Disposition:  ICU    This patient was at high risk of neurologic deterioration and/or death due to:     Time spent:  45 minutes    Code status:  Full code  Disposition:  ICU    This patient was at high risk of neurologic deterioration and/or death due to: brain bleed, herniation    Time spent:  35 minutes Summary: 62 year old male pmhx of paroxysmal Afib (on Eliquis), CVA and TIA presented to ED w/ L sided weakness. NIHSS 3. Received tPA, and was noted to have worsening of neuro exam. Found to have Pontine hemorrhage with IVH, hydrocephalus. Family decided to pursue comfort measures. Palliative consulted.    NEURO:   Neurochecks q shift  DC EVD  Patient to transfer to PCU  Precedex and Fentany prn if needed    CARDS:  SBP <140  Nicardipine gtt if needed    PULM:  Intubated  Terminal extubation per family    RENAL:  IVL    GASTRO:  Comfort measures only    HEME:    ---> DVT prophylaxis: Hold AC    ENDO:  No FS; comfort measures only    ID:  Afebrile.     Code status:  DNR  Disposition:  PCU    This patient was at high risk of neurologic deterioration and/or death due to: brain hemorrhage, brain herniation    Time spent:  25 minutes

## 2018-10-17 NOTE — CONSULT NOTE ADULT - SUBJECTIVE AND OBJECTIVE BOX
HPI:  The patient is a 61 yo m with PMHx of stroke with unknown origin on eliquis last dose > 25 hours before who presented with sudden onset of left sided weakness. He woke up at 330 am when he was at his baseline, he goes back to sleep when he woke up at 7 am he felt his left side is weak and he has a facial droop. No visual disturbances. no diplopia.  He is within the window for tPA, and he missed the last night dose of Eliquis, PT/PTT/INR are WNL. Risks and benefits of treatment were explained. He is agreeable with the tPA treatment. Patient is not sure why he is on Eliquis, states it may be due to multiple TIAs in the past, reported he may have had paroxysmal Afib but is not sure.     Bolus of 9mg recieved at 7:13am which was followed by a 81mg drip.    NIHSS-3, MRS-0 (16 Oct 2018 07:55)    PERTINENT PM/SXH:   Renal calculus  Atrial fibrillation, unspecified type  Transient cerebral ischemia, unspecified type  Type 2 diabetes mellitus without complication, without long-term current use of insulin  Borderline diabetes mellitus  HLD (hyperlipidemia)  HTN (hypertension)  CAD (coronary artery disease)    Status post laser lithotripsy of ureteral calculus  S/P coronary artery bypass graft x 3    FAMILY HISTORY:  Family history of stroke (Father)    ITEMS NOT CHECKED ARE NOT PRESENT    SOCIAL HISTORY:   Significant other/partner:  [x ]  Children:  [ ]  Hinduism/Spirituality:  Substance hx:  [ ]   Tobacco hx:  [ ]   Alcohol hx: [ ]   Home Opioid hx:  [ ] I-Stop Reference No:  Living Situation: [x ]Home  [ ]Long term care  [ ]Rehab [ ]Other    ADVANCE DIRECTIVES:    DNR  YES MOLST  [ ]  Living Will  [ ]   DECISION MAKER(s):  [ ] Health Care Proxy(s)  [X ] Surrogate(s)  [ ] Guardian           Name(s): Phone Number(s):  CB EVERETT  BASELINE (I)ADL(s) (prior to admission):  [place]: [ X]Total  [ ] Moderate [ ]Dependent    Allergies    No Known Allergies    Intolerances    MEDICATIONS  (STANDING):  atorvastatin 80 milliGRAM(s) Oral at bedtime  chlorhexidine 0.12% Liquid 15 milliLiter(s) Oral Mucosa two times a day  chlorhexidine 4% Liquid 1 Application(s) Topical <User Schedule>  dexmedetomidine Infusion 0.2 MICROgram(s)/kG/Hr (5.27 mL/Hr) IV Continuous <Continuous>  dextrose 5%. 1000 milliLiter(s) (50 mL/Hr) IV Continuous <Continuous>  dextrose 50% Injectable 12.5 Gram(s) IV Push once  dextrose 50% Injectable 25 Gram(s) IV Push once  dextrose 50% Injectable 25 Gram(s) IV Push once  folic acid 1 milliGRAM(s) Oral daily  insulin lispro (HumaLOG) corrective regimen sliding scale   SubCutaneous every 6 hours  multivitamin 1 Tablet(s) Oral daily  niCARdipine Infusion 5 mG/Hr (25 mL/Hr) IV Continuous <Continuous>  pantoprazole  Injectable 40 milliGRAM(s) IV Push daily  sodium chloride 2% 1000 milliLiter(s) (75 mL/Hr) IV Continuous <Continuous>    MEDICATIONS  (PRN):  dextrose 40% Gel 15 Gram(s) Oral once PRN Blood Glucose LESS THAN 70 milliGRAM(s)/deciliter  fentaNYL    Injectable 25 MICROGram(s) IV Push every 2 hours PRN Severe Pain (7 - 10)  glucagon  Injectable 1 milliGRAM(s) IntraMuscular once PRN Glucose LESS THAN 70 milligrams/deciliter  ondansetron Injectable 4 milliGRAM(s) IV Push every 6 hours PRN Nausea and/or Vomiting    PRESENT SYMPTOMS: [X ]Unable to obtain due to poor mentation   Source if other than patient:  [ ]Family   [ ]Team     Pain (Impact on QOL):    Location -         Minimal acceptable level (0-10 scale):                    Aggrevating factors -  Quality -  Radiation -  Severity (0-10 scale) -    Timing -    PAIN AD Score: 0    [email or web address] (press ctrl +  left click to view)    Dyspnea:                           [ ]Mild [ ]Moderate [ ]Severe  Anxiety:                             [ ]Mild [ ]Moderate [ ]Severe  Fatigue:                             [ ]Mild [ ]Moderate [ ]Severe  Nausea:                             [ ]Mild [ ]Moderate [ ]Severe  Loss of appetite:              [ ]Mild [ ]Moderate [ ]Severe  Constipation:                    [ ]Mild [ ]Moderate [ ]Severe    Other Symptoms:  [ ]All other review of systems negative     Karnofsky Performance Score/Palliative Performance Status Version 2:     10    %  PHYSICAL EXAM:  Vital Signs Last 24 Hrs  T(C): 36.9 (17 Oct 2018 07:00), Max: 36.9 (17 Oct 2018 07:00)  T(F): 98.5 (17 Oct 2018 07:00), Max: 98.5 (17 Oct 2018 07:00)  HR: 86 (17 Oct 2018 11:00) (59 - 106)  BP: 122/68 (16 Oct 2018 15:20) (122/68 - 178/92)  BP(mean): 84 (16 Oct 2018 15:20) (84 - 115)  RR: 17 (17 Oct 2018 11:00) (8 - 23)  SpO2: 98% (17 Oct 2018 11:00) (92% - 100%) I&O's Summary    16 Oct 2018 07:01  -  17 Oct 2018 07:00  --------------------------------------------------------  IN: 2696.5 mL / OUT: 2278 mL / NET: 418.5 mL    17 Oct 2018 07:01  -  17 Oct 2018 11:55  --------------------------------------------------------  IN: 500 mL / OUT: 269 mL / NET: 231 mL    GENERAL:  [ ]Alert  [ ]Oriented x   [ ]Lethargic  [ ]Cachexia  [X]Unarousable  [ ]Verbal  [X ]Non-Verbal  Behavioral:   [ ] Anxiety  [ ] Delirium [ ] Agitation [ ] Other  HEENT:  [ ]Normal   [ ]Dry mouth   [X ]ET Tube/Trach  [ ]Oral lesions  PULMONARY: VENTED   [ ]Clear [ ]Tachypnea  [ ]Audible excessive secretions   [ ]Rhonchi        [ ]Right [ ]Left [ ]Bilateral  [ ]Crackles        [ ]Right [ ]Left [ ]Bilateral  [ ]Wheezing     [ ]Right [ ]Left [ ]Bilateral  CARDIOVASCULAR:    [X ]Regular [ ]Irregular [ ]Tachy  [ ]Jeremy [ ]Murmur [ ]Other  GASTROINTESTINAL:  [X ]Soft  [ ]Distended   [X ]+BS  [ ]Non tender [ ]Tender  [ X]PEG [ ]OGT/ NGT  Last BM:   GENITOURINARY:  [ ]Normal [X ] Incontinent   [ ]Oliguria/Anuria   [ ]Roca  MUSCULOSKELETAL:   [ ]Normal   [ ]Weakness  X]Bed/Wheelchair bound [ ]Edema  NEUROLOGIC:   [ ]No focal deficits  [X ] Cognitive impairment  [ ] Dysphagia [ ]Dysarthria [ ] Paresis [ ]Other   SKIN:   [X ]Normal   [ ]Pressure ulcer(s)  [ ]Rash    CRITICAL CARE:  [ ] Shock Present  [ ]Septic [ ]Cardiogenic [X ]Neurologic [ ]Hypovolemic  [ ]  Vasopressors [ ]  Inotropes   [X ] Respiratory failure present  [X ] Acute  [ ] Chronic [ X] Hypoxic  [ X] Hypercarbic [ ] Other  [ ] Other organ failure     LABS:                        11.5   11.5  )-----------( 343      ( 16 Oct 2018 21:30 )             34.2   10-16    140  |  102  |  14  ----------------------------<  133<H>  4.0   |  24  |  1.17    Ca    8.5      16 Oct 2018 21:30  Phos  4.0     10-16  Mg     1.8     10-16    TPro  7.3  /  Alb  4.2  /  TBili  0.3  /  DBili  x   /  AST  20  /  ALT  17  /  AlkPhos  91  10-16  PT/INR - ( 17 Oct 2018 05:16 )   PT: 12.8 sec;   INR: 1.17 ratio         PTT - ( 17 Oct 2018 05:16 )  PTT:27.7 sec      RADIOLOGY & ADDITIONAL STUDIES:      INTERPRETATION:  INDICATIONS:  rt yasmin bleed    TECHNIQUE:  Serial axial images were obtained from the skull base to the   vertex without intravenous contrast.    COMPARISON EXAMINATION: 10/16/2018    FINDINGS:    VENTRICLES AND SULCI: Progressive ventricular dilatation with hemorrhage   layering in the occipital horns of the lateral ventricle  INTRA-AXIAL:  Evidence of brainstem hemorrhage slightly progressed when   compared to the prior study as well. Right parietal infarct is apparent.   Right posterior frontal distribution infarct  EXTRA-AXIAL:  No mass or collection is seen.  VISUALIZED SINUSES:  Clear.  VISUALIZED MASTOIDS:  Clear.  CALVARIUM:  Normal.  MISCELLANEOUS: A shunt is intubated with an NG tube    IMPRESSION: Extension of the brainstem hemorrhage to the right midbrain.   Progressive ventricular dilatation with hemorrhage layering in the   occipital horns of the lateral ventricles bilaterally. Old right parietal   distribution infarct. This was identified on the prior as well        PROTEIN CALORIE MALNUTRITION:   [ ] PPSV2 < or = to 30% [ ] significant weight loss  [ ] poor nutritional intake [ ] catabolic state [ ] anasarca     Albumin, Serum: 4.2 g/dL (10-16-18 @ 06:53)  Artificial Nutrition [ ]     REFERRALS:   [ X]Chaplaincy  [ ] Hospice  [ ]Child Life  [X ]Social Work  [ ]Case management [ ]Holistic Therapy   Goals of Care Discussion Document:

## 2018-10-17 NOTE — OCCUPATIONAL THERAPY INITIAL EVALUATION ADULT - PERTINENT HX OF CURRENT PROBLEM, REHAB EVAL
61 yo M with PMHx of stroke with unknown origin on eliquis last dose > 25 hours before who presented with sudden onset of left sided weakness. He woke up at 330 am when he was at his baseline, he goes back to sleep when he woke up at 7 am he felt his left side is weak and he has a facial droop. No visual disturbances. no diplopia. See below

## 2018-10-17 NOTE — CONSULT NOTE ADULT - ATTENDING COMMENTS
I personally examined this patient
As per above resident note.  Examined with resident.  Pt with pinpoint pupils, right is mildly reactive, no corneal, some spontaneous respirations, + gag and cough.  Bilateral decerebrate posturing. GCS 4.  Pt with CT showing right pontine/midbrain hemorrhage, no hydrocephalus noted, no blood in 4th ventricle.  rTPA reversal and Kcentra for eliquis.  Plan short-interval repeat imaging and potential need for EVD if obstructive HCP develops.

## 2018-10-17 NOTE — GOALS OF CARE CONVERSATION - PERSONAL ADVANCE DIRECTIVE - TREATMENT GUIDELINES
Do not re-hospitalize/No blood draws/Comfort measures only/DNR Order/No artificial nutrition/No IV fluids/No antibiotics

## 2018-10-17 NOTE — CONSULT NOTE ADULT - SUBJECTIVE AND OBJECTIVE BOX
Patient is a 62y old  Male who presents with a chief complaint of left sided weakness (17 Oct 2018 06:45)      HPI:  The patient is a 63 yo m with PMHx of stroke with unknown origin on eliquis last dose > 25 hours before who presented with sudden onset of left sided weakness. He woke up at 330 am when he was at his baseline, he goes back to sleep when he woke up at 7 am he felt his left side is weak and he has a facial droop. No visual disturbances. no diplopia.  He is within the window for tPA, and he missed the last night dose of Eliquis, PT/PTT/INR are WNL. Risks and benefits of treatment were explained. He is agreeable with the tPA treatment. Patient is not sure why he is on Eliquis, states it may be due to multiple TIAs in the past, reported he may have had paroxysmal Afib but is not sure.     Bolus of 9mg recieved at 7:13am which was followed by a 81mg drip.    NIHSS-3, MRS-0 (16 Oct 2018 07:55)    Events of past 24 hours are noted.  Pt admitted with left sided weakness.  Initial CT without bleed.  Pt treated with TPA.  Sustained hemorrhagic conversion.  Now comatose and unresponsive on respiratory support in neurosurgical ICU.  History from neuro providers and family and staff.  I had a long d/w pt's wife and daughter re patient's condition, treatment and prognosis.  Also d/w Dr. Alonso and neuro staff.      PAST MEDICAL & SURGICAL HISTORY:  Atrial fibrillation -paroxysmal  Transient cerebral ischemia and stoke  Type 2 diabetes mellitus without complication, without long-term current use of insulin  HLD (hyperlipidemia)  HTN (hypertension)  CAD (coronary artery disease) S/P remote coronary artery bypass graft x 3   Status post laser lithotripsy of ureteral calculus    ALLERGIES:    No Known Allergies       MEDICATIONS  (STANDING):  atorvastatin 80 milliGRAM(s) Oral at bedtime  chlorhexidine 0.12% Liquid 15 milliLiter(s) Oral Mucosa two times a day  dexmedetomidine Infusion 0.2 MICROgram(s)/kG/Hr (5.27 mL/Hr) IV Continuous <Continuous>  dextrose 5%. 1000 milliLiter(s) (50 mL/Hr) IV Continuous <Continuous>  dextrose 50% Injectable 12.5 Gram(s) IV Push once  dextrose 50% Injectable 25 Gram(s) IV Push once  dextrose 50% Injectable 25 Gram(s) IV Push once  folic acid 1 milliGRAM(s) Oral daily  insulin lispro (HumaLOG) corrective regimen sliding scale   SubCutaneous every 6 hours  multivitamin 1 Tablet(s) Oral daily  niCARdipine Infusion 5 mG/Hr (25 mL/Hr) IV Continuous <Continuous>  pantoprazole  Injectable 40 milliGRAM(s) IV Push daily  sodium chloride 2% 1000 milliLiter(s) (75 mL/Hr) IV Continuous <Continuous>    MEDICATIONS  (PRN):  dextrose 40% Gel 15 Gram(s) Oral once PRN Blood Glucose LESS THAN 70 milliGRAM(s)/deciliter  fentaNYL    Injectable 25 MICROGram(s) IV Push every 2 hours PRN Severe Pain (7 - 10)  glucagon  Injectable 1 milliGRAM(s) IntraMuscular once PRN Glucose LESS THAN 70 milligrams/deciliter      FAMILY HISTORY:  Family history of stroke (Father)      SOCIAL HISTORY:  N/C    ROS:   N/C    Vital Signs Last 24 Hrs  T(C): 36.5 (17 Oct 2018 03:00), Max: 36.8 (16 Oct 2018 08:29)  T(F): 97.7 (17 Oct 2018 03:00), Max: 98.3 (16 Oct 2018 08:44)  HR: 76 (17 Oct 2018 06:45) (59 - 106)  BP: 122/68 (16 Oct 2018 15:20) (122/68 - 178/92)  BP(mean): 84 (16 Oct 2018 15:20) (84 - 115)  RR: 18 (17 Oct 2018 06:45) (8 - 23)  SpO2: 97% (17 Oct 2018 06:45) (92% - 100%)    I&O's Summary    16 Oct 2018 07:01  -  17 Oct 2018 07:00  --------------------------------------------------------  IN: 2696.5 mL / OUT: 2168 mL / NET: 528.5 mL        PHYSICAL EXAM:  Daily     Daily   Drug Dosing Weight  Height (cm): 177.8 (17 Oct 2017 11:46)  Weight (kg): 105.4 (16 Oct 2018 07:15)  BMI (kg/m2): 33.3 (16 Oct 2018 07:15)  BSA (m2): 2.22 (16 Oct 2018 07:15)  General Appearance:    Comfortable, non responsive to voice, on ventilator without spontaneous breathing, in no distress.   HEENT:                       Atraumatic, pupils pinpoint b/l without response to light conjunctiva clear.   Neck:                          Supple, no adenopathy, no thyromegaly, no JVD, no carotid bruit. Intubated  Chest:                         Clear to auscultation, no wheezes, rales or rhonchi, symmetric air entry, no tachypnea, retractions or cyanosis  Heart:                          S1, S2 normal, no gallop, no murmur.  Abdomen:                    Soft, non-tender, bowel sounds active. No palpable masses.   Extremities:                 no cyanosis, no edema, no joint swelling. Peripheral pulses normal  Skin:                           Skin color, texture normal. No rashes   Neurologic:                 unresponsive to voice, extensor withdrawal to shining light in eyes.  No spontaneous breathing on ventilator      TELEMETRY: RSR    ECG: < from: 12 Lead ECG (10.16.18 @ 07:16) >  Ventricular Rate 87 BPM    Atrial Rate 87 BPM    P-R Interval 182 ms    QRS Duration 116 ms    Q-T Interval 394 ms    QTC Calculation(Bezet) 474 ms    P Axis 71 degrees    R Axis 8 degrees    T Axis 29 degrees    Diagnosis Line NORMAL SINUS RHYTHM  LOW VOLTAGE QRS  RIGHT BUNDLE BRANCH BLOCK  ABNORMAL ECG  No acute changes    Confirmed by ATTENDING, ED ([id number]),  HOLA MARQUES ([id number]) on 10/16/2018 7:19:20 AM    < end of copied text >      LABS:  CARDIAC MARKERS ( 16 Oct 2018 06:53 )  x     / x     / 226 U/L / x     / x                                11.5   11.5  )-----------( 343      ( 16 Oct 2018 21:30 )             34.2     10-16    140  |  102  |  14  ----------------------------<  133<H>  4.0   |  24  |  1.17    Ca    8.5      16 Oct 2018 21:30  Phos  4.0     10-16  Mg     1.8     10-16    TPro  7.3  /  Alb  4.2  /  TBili  0.3  /  DBili  x   /  AST  20  /  ALT  17  /  AlkPhos  91  10-16    Lipid Panel  Chl 88  HDL 44  LDL 27  Trg 83      10-16 @ 06:53  Trop-I  --  CK      226  CK-MB   --      PT/INR - ( 17 Oct 2018 05:16 )   PT: 12.8 sec;   INR: 1.17 ratio         PTT - ( 17 Oct 2018 05:16 )  PTT:27.7 sec    ABG - ( 16 Oct 2018 21:07 )  pH, Arterial: 7.37  pH, Blood: x     /  pCO2: 50    /  pO2: 120   / HCO3: 28    / Base Excess: 2.7   /  SaO2: 98                    RADIOLOGY & ADDITIONAL STUDIES:    < from: CT Head No Cont (10.16.18 @ 13:54) >  EXAM:  CT BRAIN                            PROCEDURE DATE:  10/16/2018            INTERPRETATION:    CLINICAL INDICATION: RRT, status post IV TPA for left-sided weakness this   a.m.    5mm axial sections of the brain were obtained from base to vertex,   without the intravenous administration of contrast material. Coronal and   sagittal computer generated reconstructed views are available.    Comparison is made with the prior CT of 6:57 AM.    There is hemorrhage in the right yasmin extending into the right brachium   pontis with some edema, new since the prior brain CT. And old right   occipital infarct is identified. Mild atrophy and small vessel white   matter ischemic changes are again identified.        IMPRESSION: New hemorrhage in the right yasmin and brachium pontis compared   with 6:58 AM.     Dr. Bran discussed these findings with neurology resident on 10/16/2018   2:01 PM with read back.    < end of copied text >  < from: CT Angio Head w/ IV Cont (10.16.18 @ 07:08) >  EXAM:  CT ANGIO BRAIN (W)AW IC                          EXAM:  CT BRAIN STROKE PROTOCOL                          EXAM:  CT ANGIO NECK (W)AW IC                            PROCEDURE DATE:  10/16/2018            INTERPRETATION:  CLINICAL INFORMATION: 3 hours from onset of left-sided   weakness.     TECHNIQUE: Noncontrast axial CT images were acquired through the head.   Two-dimensional sagittal and coronal reformats were generated. Contrast   enhanced axial CT images were acquired from the aortic arch to the vertex   of the calvarium, during the angiographic phase. 90 cc of Omnipaque-350   mg/ml were administered intravenously, without immediate complication.    COMPARISON: Head CT dated 5/21/2014    FINDINGS:   NONCONTRAST CT BRAIN:    There is no CT evidence of acute intracranial hemorrhage, extra-axial   collection, vasogenic edema, mass effect, midline shift, central   herniation, hydrocephalus or transcortical infarct.     There is age-appropriate cerebral volume loss. Chronic right MCA   territory infarct.    The visualized paranasal sinuses are clear.    The mastoid air cells and middle ear cavities are grossly clear.    The calvarium is intact.      CT ANGIOGRAPHY NECK:    The visualized aortic arch and great vessels are patentwith no   significant stenosis or major vessel occlusion.    There is no evidence for significant stenosis or major vessel occlusion   involving the bilateral carotid arteries. No significant stenosis at the   left carotid bifurcation. There is mild atherosclerotic stenosis at the   right carotid bifurcation, measuring approximately 50%.  There is no   evidence for significant stenosis or major vessel occlusion involving the   bilateral vertebral arteries. The left vertebral artery is dominant.    Hypodense left thyroid lobe nodule measuring 1.2 cm. Ultrasound can be   obtained for further evaluation.    Visualized osseous structures are unremarkable.      CT ANGIOGRAPHY BRAIN:  There is no evidence for significant stenosis, major vessel occlusion, or   aneurysm about the [place].     The left A1 segment is hypoplastic. There is no evidence for significant   stenosis, major vessel occlusion, or aneurysm involving the   vertebrobasilar system.    No enlarged vascular lesions or clusters of abnormal vessels are noted to   suggest an arterial venous malformation within the field-of-view.    Visualized portions of the superficial and deep venous systems are   unremarkable.      IMPRESSION:   Noncontrast CT brain: There is no evidence of acute intracranial   hemorrhage, extra-axial collection, vasogenic edema, mass effect, midline   shift, central herniation, hydrocephalus or transcortical infarct.     Chronic right MCA territory infarct.    CT angiography neck: No evidence ofhemodynamically significant stenosis   using NASCET criteria.  Patent vertebral arteries. Atherosclerotic   disease as above    CT angiography brain: No major vessel occlusion or proximal stenosis.      Findings were discussed with Dr. Russell by  on 10/16/2018 at   7:10 AM with readback.                MANDO SWANN M.D., RADIOLOGY RESIDENT  This document has been electronically signed.  JUAN MILLER M.D., ATTENDING RADIOLOGIST  This document has been electronically signed. Oct 16 2018 7:23AM    < end of copied text >          HEALTH ISSUES - PROBLEM Dx:    Hemorrhagic [place] Stroke after TPA  h/o Atrial fibrillation -paroxysmal in RSR  h/o Transient cerebral ischemia, and stroke   CAD (coronary artery disease) S/P coronary artery bypass graft x 3 - stable  Type 2 diabetes mellitus without complication, without long-term current use of insulin  HLD (hyperlipidemia)  HTN (hypertension)  Status post laser lithotripsy of ureteral calculus

## 2018-10-17 NOTE — OCCUPATIONAL THERAPY INITIAL EVALUATION ADULT - ADDITIONAL COMMENTS
He is within the window for tPA, and he missed the last night dose of Eliquis, PT/PTT/INR are WNL. Risks and benefits of treatment were explained. He is agreeable with the tPA treatment. Patient is not sure why he is on Eliquis, states it may be due to multiple TIAs in the past, reported he may have had paroxysmal Afib but is not sure.   CT Head: Extension of the brainstem hemorrhage to the right midbrain. Progressive ventricular dilatation with hemorrhage layering in the occipital horns of the lateral ventricles bilaterally. Old right parietal distribution infarct. This was identified on the prior as well

## 2018-10-17 NOTE — PROGRESS NOTE ADULT - SUBJECTIVE AND OBJECTIVE BOX
Visit Summary: Patient seen and evaluated at bedside.    Overnight Events: none    Exam:  T(C): 36.5 (10-17-18 @ 03:00), Max: 36.8 (10-16-18 @ 07:29)  HR: 83 (10-17-18 @ 03:00) (59 - 106)  BP: 122/68 (10-16-18 @ 15:20) (122/68 - 178/92)  RR: 18 (10-17-18 @ 03:00) (8 - 23)  SpO2: 98% (10-17-18 @ 03:00) (92% - 100%)  Wt(kg): --    Intubated, PERRL, +corneals  BUE extensor  BLE TF                        11.5   11.5  )-----------( 343      ( 16 Oct 2018 21:30 )             34.2     10-16    140  |  102  |  14  ----------------------------<  133<H>  4.0   |  24  |  1.17    Ca    8.5      16 Oct 2018 21:30  Phos  4.0     10-16  Mg     1.8     10-16    TPro  7.3  /  Alb  4.2  /  TBili  0.3  /  DBili  x   /  AST  20  /  ALT  17  /  AlkPhos  91  10-16  PT/INR - ( 16 Oct 2018 22:20 )   PT: 13.7 sec;   INR: 1.25 ratio         PTT - ( 16 Oct 2018 22:20 )  PTT:28.2 sec

## 2018-10-17 NOTE — PROGRESS NOTE ADULT - SUBJECTIVE AND OBJECTIVE BOX
Pt seen earlier today had bilateral pin-point pupils, no corneal, no gag, no cough, no movement to noxious stim.  CT last night had shown brainstem hemorrhage expansion and HCP.  EVD placed with drainage of fluid, ICP was controlled today.  Discussed with stroke team and family that pt's prognosis was grave and suggested comfort measure, terminal wean versus potential of prolonged ventilator dependency.  Palliative Care was called.  Emotional support given.

## 2018-10-18 DIAGNOSIS — Z71.89 OTHER SPECIFIED COUNSELING: ICD-10-CM

## 2018-10-18 DIAGNOSIS — J96.00 ACUTE RESPIRATORY FAILURE, UNSPECIFIED WHETHER WITH HYPOXIA OR HYPERCAPNIA: ICD-10-CM

## 2018-10-18 DIAGNOSIS — R06.6 HICCOUGH: ICD-10-CM

## 2018-10-18 PROCEDURE — 99233 SBSQ HOSP IP/OBS HIGH 50: CPT | Mod: GC

## 2018-10-18 RX ORDER — MIDAZOLAM HYDROCHLORIDE 1 MG/ML
0.02 INJECTION, SOLUTION INTRAMUSCULAR; INTRAVENOUS
Qty: 100 | Refills: 0 | Status: DISCONTINUED | OUTPATIENT
Start: 2018-10-18 | End: 2018-10-19

## 2018-10-18 RX ORDER — HALOPERIDOL DECANOATE 100 MG/ML
1 INJECTION INTRAMUSCULAR ONCE
Qty: 0 | Refills: 0 | Status: COMPLETED | OUTPATIENT
Start: 2018-10-18 | End: 2018-10-18

## 2018-10-18 RX ORDER — LABETALOL HCL 100 MG
0.5 TABLET ORAL
Qty: 400 | Refills: 0 | Status: DISCONTINUED | OUTPATIENT
Start: 2018-10-18 | End: 2018-10-18

## 2018-10-18 RX ORDER — DEXMEDETOMIDINE HYDROCHLORIDE IN 0.9% SODIUM CHLORIDE 4 UG/ML
0.2 INJECTION INTRAVENOUS
Qty: 200 | Refills: 0 | Status: DISCONTINUED | OUTPATIENT
Start: 2018-10-18 | End: 2018-10-18

## 2018-10-18 RX ORDER — METOCLOPRAMIDE HCL 10 MG
10 TABLET ORAL EVERY 6 HOURS
Qty: 0 | Refills: 0 | Status: DISCONTINUED | OUTPATIENT
Start: 2018-10-18 | End: 2018-10-18

## 2018-10-18 RX ORDER — HALOPERIDOL DECANOATE 100 MG/ML
1 INJECTION INTRAMUSCULAR ONCE
Qty: 0 | Refills: 0 | Status: DISCONTINUED | OUTPATIENT
Start: 2018-10-18 | End: 2018-10-18

## 2018-10-18 RX ORDER — HALOPERIDOL DECANOATE 100 MG/ML
2 INJECTION INTRAMUSCULAR
Qty: 0 | Refills: 0 | Status: DISCONTINUED | OUTPATIENT
Start: 2018-10-18 | End: 2018-10-18

## 2018-10-18 RX ORDER — HALOPERIDOL DECANOATE 100 MG/ML
1 INJECTION INTRAMUSCULAR
Qty: 0 | Refills: 0 | Status: DISCONTINUED | OUTPATIENT
Start: 2018-10-18 | End: 2018-10-18

## 2018-10-18 RX ORDER — METOCLOPRAMIDE HCL 10 MG
10 TABLET ORAL EVERY 6 HOURS
Qty: 0 | Refills: 0 | Status: DISCONTINUED | OUTPATIENT
Start: 2018-10-18 | End: 2018-10-19

## 2018-10-18 RX ORDER — LABETALOL HCL 100 MG
0.05 TABLET ORAL
Qty: 400 | Refills: 0 | Status: DISCONTINUED | OUTPATIENT
Start: 2018-10-18 | End: 2018-10-19

## 2018-10-18 RX ORDER — MIDAZOLAM HYDROCHLORIDE 1 MG/ML
2 INJECTION, SOLUTION INTRAMUSCULAR; INTRAVENOUS
Qty: 0 | Refills: 0 | Status: DISCONTINUED | OUTPATIENT
Start: 2018-10-18 | End: 2018-10-19

## 2018-10-18 RX ADMIN — HALOPERIDOL DECANOATE 1 MILLIGRAM(S): 100 INJECTION INTRAMUSCULAR at 08:35

## 2018-10-18 RX ADMIN — CHLORHEXIDINE GLUCONATE 15 MILLILITER(S): 213 SOLUTION TOPICAL at 17:31

## 2018-10-18 RX ADMIN — MIDAZOLAM HYDROCHLORIDE 2.11 MG/KG/HR: 1 INJECTION, SOLUTION INTRAMUSCULAR; INTRAVENOUS at 19:29

## 2018-10-18 RX ADMIN — ROBINUL 0.4 MILLIGRAM(S): 0.2 INJECTION INTRAMUSCULAR; INTRAVENOUS at 19:39

## 2018-10-18 RX ADMIN — ONDANSETRON 4 MILLIGRAM(S): 8 TABLET, FILM COATED ORAL at 06:15

## 2018-10-18 RX ADMIN — MIDAZOLAM HYDROCHLORIDE 2 MILLIGRAM(S): 1 INJECTION, SOLUTION INTRAMUSCULAR; INTRAVENOUS at 19:39

## 2018-10-18 RX ADMIN — Medication 10 MILLIGRAM(S): at 10:20

## 2018-10-18 RX ADMIN — DEXMEDETOMIDINE HYDROCHLORIDE IN 0.9% SODIUM CHLORIDE 5.27 MICROGRAM(S)/KG/HR: 4 INJECTION INTRAVENOUS at 08:37

## 2018-10-18 RX ADMIN — MIDAZOLAM HYDROCHLORIDE 2 MILLIGRAM(S): 1 INJECTION, SOLUTION INTRAMUSCULAR; INTRAVENOUS at 18:18

## 2018-10-18 RX ADMIN — PANTOPRAZOLE SODIUM 40 MILLIGRAM(S): 20 TABLET, DELAYED RELEASE ORAL at 12:25

## 2018-10-18 RX ADMIN — Medication 3 MG/MIN: at 19:39

## 2018-10-18 RX ADMIN — HALOPERIDOL DECANOATE 1 MILLIGRAM(S): 100 INJECTION INTRAMUSCULAR at 09:12

## 2018-10-18 RX ADMIN — Medication 10 MILLIGRAM(S): at 14:53

## 2018-10-18 RX ADMIN — Medication 30 MG/MIN: at 08:38

## 2018-10-18 RX ADMIN — HALOPERIDOL DECANOATE 1 MILLIGRAM(S): 100 INJECTION INTRAMUSCULAR at 06:15

## 2018-10-18 RX ADMIN — ROBINUL 0.4 MILLIGRAM(S): 0.2 INJECTION INTRAMUSCULAR; INTRAVENOUS at 06:15

## 2018-10-18 RX ADMIN — MIDAZOLAM HYDROCHLORIDE 2.11 MG/KG/HR: 1 INJECTION, SOLUTION INTRAMUSCULAR; INTRAVENOUS at 14:10

## 2018-10-18 RX ADMIN — CHLORHEXIDINE GLUCONATE 15 MILLILITER(S): 213 SOLUTION TOPICAL at 05:27

## 2018-10-18 NOTE — PROGRESS NOTE ADULT - SUBJECTIVE AND OBJECTIVE BOX
GAP TEAM PALLIATIVE CARE UNIT PROGRESS NOTE:      [  ] Patient on hospice program.    INDICATION FOR PALLIATIVE CARE UNIT SERVICES: persistant hiccups, possible extubation, supportive care    INTERVAL HPI/OVERNIGHT EVENTS: transferred to PCU    DNR on chart: Yes    Allergies  No Known Allergies    Intolerances    MEDICATIONS  (STANDING):  atorvastatin 80 milliGRAM(s) Oral at bedtime  chlorhexidine 0.12% Liquid 15 milliLiter(s) Oral Mucosa two times a day  dexmedetomidine Infusion 0.2 MICROgram(s)/kG/Hr (5.27 mL/Hr) IV Continuous <Continuous>  folic acid 1 milliGRAM(s) Oral daily  labetalol Infusion 0.05 mG/Min (3 mL/Hr) IV Continuous <Continuous>  metoclopramide Injectable 10 milliGRAM(s) IV Push every 6 hours  multivitamin 1 Tablet(s) Oral daily  pantoprazole  Injectable 40 milliGRAM(s) IV Push daily    MEDICATIONS  (PRN):  glucagon  Injectable 1 milliGRAM(s) IntraMuscular once PRN Glucose LESS THAN 70 milligrams/deciliter  glycopyrrolate Injectable 0.4 milliGRAM(s) IV Push every 6 hours PRN secretions  haloperidol    Injectable 2 milliGRAM(s) IV Push every 2 hours PRN hiccups  morphine  - Injectable 2 milliGRAM(s) IV Push every 1 hour PRN pain  morphine  - Injectable 2 milliGRAM(s) IV Push every 1 hour PRN dyspnea  ondansetron Injectable 4 milliGRAM(s) IV Push every 6 hours PRN Nausea and/or Vomiting      ITEMS UNCHECKED ARE NOT PRESENT    PRESENT SYMPTOMS: [x ]Unable to obtain due to poor mentation     Source if other than patient:  [ ]Family   [ ]Team     Pain (Impact on QOL):    Location:       Minimal acceptable level (0-10 scale):  Aggravating factors:  Quality:  Radiation:  Severity (0-10 scale):     Dyspnea:                           [ ]Mild [ ]Moderate [ ]Severe  Anxiety:                             [ ]Mild [ ]Moderate [ ]Severe  Fatigue:                             [ ]Mild [ ]Moderate [ ]Severe  Nausea:                             [ ]Mild [ ]Moderate [ ]Severe  Loss of appetite:               [ ]Mild [ ]Moderate [ ]Severe  Constipation:                    [ ]Mild [ ]Moderate [ ]Severe    PAINAD Score:    http://[email or web address]/painad.pdf (Ctrl +  left click to view)  		  Other Symptoms:  [ ]All other review of systems negative     Karnofsky Performance Score/Palliative Performance Status Version 2:      10   %        http://palliative.info/resource_material/PPSv2.pdf    PHYSICAL EXAM:    Vital Signs Last 24 Hrs  T(C): 36.6 (18 Oct 2018 08:06), Max: 36.9 (17 Oct 2018 11:00)  T(F): 97.9 (18 Oct 2018 08:06), Max: 98.5 (17 Oct 2018 11:00)  HR: 95 (18 Oct 2018 08:15) (74 - 108)  BP: 159/93 (18 Oct 2018 08:06) (118/77 - 159/93)  BP(mean): 82 (17 Oct 2018 18:00) (82 - 82)  RR: 19 (17 Oct 2018 18:00) (12 - 28)  SpO2: 98% (18 Oct 2018 08:15) (95% - 99%)    GENERAL:  [ ]Alert  [ ]Oriented x   [ ]Lethargic  [ ]Cachexia  [x ]Unarousable  [ ]Verbal  [ x]Non-Verbal    Behavioral:   [ ] Anxiety  [ ] Delirium [ ] Agitation [ ] Other    HEENT:  [ ]Normal   [ ]Dry mouth   [ x]ET Tube/Trach  [ ]Oral lesions    PULMONARY:   [x ]Clear [ ]Tachypnea  [ ]Audible excessive secretions   [ ]Rhonchi        [ ]Right [ ]Left [ ]Bilateral  [ ]Crackles        [ ]Right [ ]Left [ ]Bilateral  [ ]Wheezing     [ ]Right [ ]Left [ ]Bilateral    CARDIOVASCULAR:    [x ]Regular [ ]Irregular [ x]Tachy  [ ]Jeremy [ ]Murmur [ ]Other    GASTROINTESTINAL:  [x ]Soft  [x ]Distended   [x ]+BS  [x]Non tender [ ]Tender  [ ]PEG [x ]OGT/ NGT   Last BM:       GENITOURINARY:  [ ]Normal [ ] Incontinent   [ ]Oliguria/Anuria   [ x]Roca    MUSCULOSKELETAL:   [ ]Normal   [ ]Weakness  [x ]Bed/Wheelchair bound [ ]Edema    NEUROLOGIC:   [ ]No focal deficits  [x ] Cognitive impairment  [ ] Dysphagia [ ]Dysarthria [ ] Paresis [ ]Other     SKIN:   [x ]Normal   [ ]Pressure ulcer(s)  [ ]Rash {x} scalp incision    CRITICAL CARE:  [ ] Shock Present  [ ]Septic [ ]Cardiogenic [ ]Neurologic [ ]Hypovolemic  [ ]  Vasopressors [ ]  Inotropes   [x ] Respiratory failure present  [ ] Acute  [ ] Chronic [ ] Hypoxic  [ ] Hypercarbic [ ] Other  [ ] Other organ failure     LABS:                        11.5   11.5  )-----------( 343      ( 16 Oct 2018 21:30 )             34.2   10-17    143  |  105  |  12  ----------------------------<  133<H>  3.8   |  23  |  1.07    Ca    8.4      17 Oct 2018 12:14  Phos  2.5     10-17  Mg     2.0     10-17    PT/INR - ( 17 Oct 2018 05:16 )   PT: 12.8 sec;   INR: 1.17 ratio         PTT - ( 17 Oct 2018 05:16 )  PTT:27.7 sec      RADIOLOGY & ADDITIONAL STUDIES:    PROTEIN CALORIE MALNUTRITION: [ ] yes   [ ]no  [ ] PPSV2 < or = 30% [ ] significant weight loss [ ] poor nutritional intake [ ] anasarca [ ] catabolic state   Albumin, Serum: 4.2 g/dL (10-16-18 @ 06:53)   Artificial Nutrition [ ]     REFERRALS:   [ ]Chaplaincy  [ ] Hospice  [ ]Child Life  [ ]Social Work  [ ]Case management [ ]Holistic Therapy [ ] Physical Therapy [ ] Dietary   Goals of Care Document: Goals of Care Conversation - Personal Advance Directive [KYAW Rodriguez] (10-17-18 @ 12:12) GAP TEAM PALLIATIVE CARE UNIT PROGRESS NOTE:      [  ] Patient on hospice program.    INDICATION FOR PALLIATIVE CARE UNIT SERVICES: persistant hiccups, possible extubation, supportive care    INTERVAL HPI/OVERNIGHT EVENTS: transferred to PCU    DNR on chart: Yes    Allergies  No Known Allergies    Intolerances    MEDICATIONS  (STANDING):  atorvastatin 80 milliGRAM(s) Oral at bedtime  chlorhexidine 0.12% Liquid 15 milliLiter(s) Oral Mucosa two times a day  dexmedetomidine Infusion 0.2 MICROgram(s)/kG/Hr (5.27 mL/Hr) IV Continuous <Continuous>  folic acid 1 milliGRAM(s) Oral daily  labetalol Infusion 0.05 mG/Min (3 mL/Hr) IV Continuous <Continuous>  metoclopramide Injectable 10 milliGRAM(s) IV Push every 6 hours  multivitamin 1 Tablet(s) Oral daily  pantoprazole  Injectable 40 milliGRAM(s) IV Push daily    MEDICATIONS  (PRN):  glucagon  Injectable 1 milliGRAM(s) IntraMuscular once PRN Glucose LESS THAN 70 milligrams/deciliter  glycopyrrolate Injectable 0.4 milliGRAM(s) IV Push every 6 hours PRN secretions  haloperidol    Injectable 2 milliGRAM(s) IV Push every 2 hours PRN hiccups  morphine  - Injectable 2 milliGRAM(s) IV Push every 1 hour PRN pain  morphine  - Injectable 2 milliGRAM(s) IV Push every 1 hour PRN dyspnea  ondansetron Injectable 4 milliGRAM(s) IV Push every 6 hours PRN Nausea and/or Vomiting      ITEMS UNCHECKED ARE NOT PRESENT    PRESENT SYMPTOMS: [x ]Unable to obtain due to poor mentation     Source if other than patient:  [ ]Family   [ ]Team     Pain (Impact on QOL):    Location:       Minimal acceptable level (0-10 scale):  Aggravating factors:  Quality:  Radiation:  Severity (0-10 scale):     Dyspnea:                           [ ]Mild [ ]Moderate [ ]Severe  Anxiety:                             [ ]Mild [ ]Moderate [ ]Severe  Fatigue:                             [ ]Mild [ ]Moderate [ ]Severe  Nausea:                             [ ]Mild [ ]Moderate [ ]Severe  Loss of appetite:               [ ]Mild [ ]Moderate [ ]Severe  Constipation:                    [ ]Mild [ ]Moderate [ ]Severe    PAINAD Score:    http://geriatrictoolkit.[email or web address]/painad.pdf (Ctrl +  left click to view)  		  Other Symptoms:  [ ]All other review of systems negative     Karnofsky Performance Score/Palliative Performance Status Version 2:      10   %        http://palliative.info/resource_material/PPSv2.pdf    PHYSICAL EXAM:    Vital Signs Last 24 Hrs  T(C): 36.6 (18 Oct 2018 08:06), Max: 36.9 (17 Oct 2018 11:00)  T(F): 97.9 (18 Oct 2018 08:06), Max: 98.5 (17 Oct 2018 11:00)  HR: 95 (18 Oct 2018 08:15) (74 - 108)  BP: 159/93 (18 Oct 2018 08:06) (118/77 - 159/93)  BP(mean): 82 (17 Oct 2018 18:00) (82 - 82)  RR: 19 (17 Oct 2018 18:00) (12 - 28)  SpO2: 98% (18 Oct 2018 08:15) (95% - 99%)    GENERAL:  [ ]Alert  [ ]Oriented x   [ ]Lethargic  [ ]Cachexia  [x ]Unarousable  [ ]Verbal  [ x]Non-Verbal    Behavioral:   [ ] Anxiety  [ ] Delirium [ ] Agitation [ ] Other    HEENT:  [ ]Normal   [ ]Dry mouth   [ x]ET Tube/Trach  [ ]Oral lesions    PULMONARY:   [x ]Clear [ ]Tachypnea  [ ]Audible excessive secretions   [ ]Rhonchi        [ ]Right [ ]Left [ ]Bilateral  [ ]Crackles        [ ]Right [ ]Left [ ]Bilateral  [ ]Wheezing     [ ]Right [ ]Left [ ]Bilateral    CARDIOVASCULAR:    [x ]Regular [ ]Irregular [ x]Tachy  [ ]Jeremy [ ]Murmur [ ]Other    GASTROINTESTINAL:  [x ]Soft  [x ]Distended   [x ]+BS  [x]Non tender [ ]Tender  [ ]PEG [x ]OGT/ NGT   Last BM:       GENITOURINARY:  [ ]Normal [ ] Incontinent   [ ]Oliguria/Anuria   [ x]Roca    MUSCULOSKELETAL:   [ ]Normal   [ ]Weakness  [x ]Bed/Wheelchair bound [ ]Edema    NEUROLOGIC:   [ ]No focal deficits  [x ] Cognitive impairment  [ ] Dysphagia [ ]Dysarthria [ ] Paresis [ ]Other     SKIN:   [x ]Normal   [ ]Pressure ulcer(s)  [ ]Rash {x} scalp incision    CRITICAL CARE:  [ ] Shock Present  [ ]Septic [ ]Cardiogenic [ ]Neurologic [ ]Hypovolemic  [ ]  Vasopressors [ ]  Inotropes   [x ] Respiratory failure present  [ ] Acute  [ ] Chronic [ ] Hypoxic  [ ] Hypercarbic [ ] Other  [ ] Other organ failure     LABS:                        11.5   11.5  )-----------( 343      ( 16 Oct 2018 21:30 )             34.2   10-17    143  |  105  |  12  ----------------------------<  133<H>  3.8   |  23  |  1.07    Ca    8.4      17 Oct 2018 12:14  Phos  2.5     10-17  Mg     2.0     10-17    PT/INR - ( 17 Oct 2018 05:16 )   PT: 12.8 sec;   INR: 1.17 ratio         PTT - ( 17 Oct 2018 05:16 )  PTT:27.7 sec      RADIOLOGY & ADDITIONAL STUDIES: no new imaging studies    PROTEIN CALORIE MALNUTRITION: [ ] yes   [x ]no  [ ] PPSV2 < or = 30% [ ] significant weight loss [ ] poor nutritional intake [ ] anasarca [ ] catabolic state   Albumin, Serum: 4.2 g/dL (10-16-18 @ 06:53)   Artificial Nutrition [ ]     REFERRALS:   [ ]Chaplaincy  [ ] Hospice  [ ]Child Life  [ x]Social Work  [ ]Case management [ ]Holistic Therapy [ ] Physical Therapy [ ] Dietary   Goals of Care Document: Goals of Care Conversation - Personal Advance Directive [KYAW Rodriguez] (10-17-18 @ 12:12)

## 2018-10-18 NOTE — PROGRESS NOTE ADULT - SUBJECTIVE AND OBJECTIVE BOX
63 y/o man admitted for CVA with left sided weakness s/p TPA s/p reversal for pontine hemorrhage was transferred to PCU overnight for symptom management. According to wife, the patient is having constant hiccups overnight, requiring haldol, which provided temporary relief. Otherwise, patient has been comfortable with no acute events.     Vitals:   T: 97.9  HR:   BP: 159/93  O2 sat: 99% on 40% FiO2    PE:   General: Man appearing stated age resting comfortably with head elevated. Intermittently having hiccups   Pulmonary: Intubated, not breathing over tube. Clear to auscultation bilaterally. No crackles or wheezes.   Cardiac: Regular rate and rhythm. No murmurs heard   Abdomen: Soft, non-distended. Limited bowel sounds.  Extremities: No lower extremity swelling     Labs:  No labs drawn     Imaging:   No new imaging 61 y/o man admitted for CVA with left sided weakness s/p TPA s/p reversal for pontine hemorrhage was transferred to PCU overnight for symptom management. According to wife, the patient is having constant hiccups overnight, requiring haldol, which provided temporary relief. Otherwise, patient has been comfortable with no acute events.     Vitals:   T: 97.9  HR:   BP: 159/93  O2 sat: 99% on 40% FiO2    PE:   General: Man appearing stated age resting comfortably with head elevated. Intermittently having hiccups   Pulmonary: Intubated, not breathing over vent. Clear to auscultation bilaterally. No crackles or wheezes.   Cardiac: Regular rate and rhythm. No murmurs heard   Abdomen: Soft, non-distended. Limited bowel sounds.  Extremities: No lower extremity swelling     Labs:  No labs drawn     Imaging:   No new imaging

## 2018-10-18 NOTE — PROGRESS NOTE ADULT - PROBLEM SELECTOR PLAN 4
multiple family members at bedside.  Questions answered emotional support provided.  Will continue GOC discussions ACP: 65 min  Please see GOC note attempted to use haldol, reglan PRN

## 2018-10-18 NOTE — PROGRESS NOTE ADULT - PROBLEM SELECTOR PLAN 5
multiple family members at bedside.  Questions answered emotional support provided.    S/p GOC discussion:  -will transition precedex to versed for symptom control of nausea/vomiting, anxiety, agitation. Will d/c all nonessential meds. ACP: 65 min  Please see GOC note

## 2018-10-18 NOTE — PROGRESS NOTE ADULT - PROBLEM SELECTOR PLAN 1
-Avoid anti-coagulation   -Fall precautions: place bed close to ground, keep railings up  -Keep head elevated -Keep head elevated  -maintain mechanical intubation for now, ongoing GOC discussions with family regarding palliative extubation.

## 2018-10-18 NOTE — PROGRESS NOTE ADULT - ASSESSMENT
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 Pt brought to PCU for continued care and symptom management.

## 2018-10-18 NOTE — PROGRESS NOTE ADULT - PROBLEM SELECTOR PLAN 4
-Continue with morphine 2 mg IVP q1 PRN for dyspnea and pain   -Continue with glycopyrrolate 0.4 mg q6 PRN for secretions  -Goals of care discussed with patient's family are comfort care but avoiding excessive sedation -Continue with morphine 2 mg IVP q1 PRN for dyspnea and pain   -Continue with glycopyrrolate 0.4 mg q6 PRN for secretions  -Goals of care discussed with patient's family are comfort care but avoiding excessive sedation medications as possible. continued discussions to be had surrounding elective extubation.

## 2018-10-18 NOTE — GOALS OF CARE CONVERSATION - PERSONAL ADVANCE DIRECTIVE - CONVERSATION DETAILS
Dr Avila met with patients wife, children and extended family.   It was determined that patient would never want to live a life of dependancy on artificial nutrition or respirators.   Pt is described as fiercely independent , enjoyed his family and his job at [employer]. He prided himself on what he has  provided for his family.  Family agree to a full comfort approach in the setting of a catastrophic hemorrhage with no likelihood for neurological or functional recovery.   They agree to transfer patient to PCU when bed becomes available.    DNR/DNI established.    While in NSCU continue current management maintaining DNR/DNI STATUS.  De escalation of non essential medications and further discussions surrounding elective extubation to take place in PCU.
ACP: 65 min'    Family meeting was held with patients wife and daughter.   Family explained patient medical management from admission to transition to palliative care unity.   All of patients critical events transpired acutely in the last 48 hours. 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 They were able to paint a picture of how funny and charming he was. The family has decided for comfort management defined as:  -DNR  -No feeding or artificial nutrition   -No blood draws  -No antibiotics  -No IV fluids    Upon today's discussion it was further defined that all nonessential meds will be d/c'ed. They explained patient always had a low threshold for GI disturbances and vomiting. For him to have intractable vomiting and hiccuping as a consequence of his brain injury is distressing for the family. They wish to continue with gastric decompression via NGT for now. They agree to transition precedex to versed drip which can also help aide with nausea, vomiting, anxiety, and restlessness. They are awaiting all family members to come pay their visits at this time.     Other than stated as above, will continue with current management.

## 2018-10-18 NOTE — PROGRESS NOTE ADULT - PROBLEM SELECTOR PLAN 2
-Continue mechanical ventilation  -Versed 0.02mg/kg/hr -Continue mechanical ventilation  -Versed 0.02mg/kg/hr for agitation, sedation, hiccups

## 2018-10-18 NOTE — PROGRESS NOTE ADULT - ASSESSMENT
61 y/o man transferred to PCU following pontine hemorrhage from tPA therapy. He is appearing comfortable, requiring mechanical ventilation. Goals of care according to family is comfort care and any symptom management requiring the least sedation. 63 y/o man transferred to PCU following pontine hemorrhage after receiving tPA therapy with poor neurologic exam. He is appearing comfortable, requiring mechanical ventilation. Goals of care ongoing with family for comfort care and symptom management.

## 2018-10-19 VITALS — OXYGEN SATURATION: 97 % | HEART RATE: 122 BPM

## 2018-10-19 DIAGNOSIS — R06.00 DYSPNEA, UNSPECIFIED: ICD-10-CM

## 2018-10-19 PROCEDURE — P9059: CPT

## 2018-10-19 PROCEDURE — 82330 ASSAY OF CALCIUM: CPT

## 2018-10-19 PROCEDURE — P9011: CPT

## 2018-10-19 PROCEDURE — 71045 X-RAY EXAM CHEST 1 VIEW: CPT

## 2018-10-19 PROCEDURE — 83735 ASSAY OF MAGNESIUM: CPT

## 2018-10-19 PROCEDURE — 99285 EMERGENCY DEPT VISIT HI MDM: CPT | Mod: 25

## 2018-10-19 PROCEDURE — 85384 FIBRINOGEN ACTIVITY: CPT

## 2018-10-19 PROCEDURE — 37195 THROMBOLYTIC THERAPY STROKE: CPT

## 2018-10-19 PROCEDURE — P9037: CPT

## 2018-10-19 PROCEDURE — 93005 ELECTROCARDIOGRAM TRACING: CPT

## 2018-10-19 PROCEDURE — 80048 BASIC METABOLIC PNL TOTAL CA: CPT

## 2018-10-19 PROCEDURE — 94002 VENT MGMT INPAT INIT DAY: CPT

## 2018-10-19 PROCEDURE — 99233 SBSQ HOSP IP/OBS HIGH 50: CPT | Mod: GC

## 2018-10-19 PROCEDURE — 70450 CT HEAD/BRAIN W/O DYE: CPT

## 2018-10-19 PROCEDURE — 84100 ASSAY OF PHOSPHORUS: CPT

## 2018-10-19 PROCEDURE — 80053 COMPREHEN METABOLIC PANEL: CPT

## 2018-10-19 PROCEDURE — 83605 ASSAY OF LACTIC ACID: CPT

## 2018-10-19 PROCEDURE — 86900 BLOOD TYPING SEROLOGIC ABO: CPT

## 2018-10-19 PROCEDURE — 84484 ASSAY OF TROPONIN QUANT: CPT

## 2018-10-19 PROCEDURE — 36600 WITHDRAWAL OF ARTERIAL BLOOD: CPT

## 2018-10-19 PROCEDURE — 84132 ASSAY OF SERUM POTASSIUM: CPT

## 2018-10-19 PROCEDURE — 85014 HEMATOCRIT: CPT

## 2018-10-19 PROCEDURE — 82435 ASSAY OF BLOOD CHLORIDE: CPT

## 2018-10-19 PROCEDURE — 82550 ASSAY OF CK (CPK): CPT

## 2018-10-19 PROCEDURE — 82565 ASSAY OF CREATININE: CPT

## 2018-10-19 PROCEDURE — 85027 COMPLETE CBC AUTOMATED: CPT

## 2018-10-19 PROCEDURE — 82947 ASSAY GLUCOSE BLOOD QUANT: CPT

## 2018-10-19 PROCEDURE — 86965 POOLING BLOOD PLATELETS: CPT

## 2018-10-19 PROCEDURE — 82803 BLOOD GASES ANY COMBINATION: CPT

## 2018-10-19 PROCEDURE — 84295 ASSAY OF SERUM SODIUM: CPT

## 2018-10-19 PROCEDURE — 36430 TRANSFUSION BLD/BLD COMPNT: CPT

## 2018-10-19 PROCEDURE — 85610 PROTHROMBIN TIME: CPT

## 2018-10-19 PROCEDURE — 70496 CT ANGIOGRAPHY HEAD: CPT

## 2018-10-19 PROCEDURE — 94003 VENT MGMT INPAT SUBQ DAY: CPT

## 2018-10-19 PROCEDURE — 80061 LIPID PANEL: CPT

## 2018-10-19 PROCEDURE — 85730 THROMBOPLASTIN TIME PARTIAL: CPT

## 2018-10-19 PROCEDURE — 86850 RBC ANTIBODY SCREEN: CPT

## 2018-10-19 PROCEDURE — P9012: CPT

## 2018-10-19 PROCEDURE — 82962 GLUCOSE BLOOD TEST: CPT

## 2018-10-19 PROCEDURE — 86901 BLOOD TYPING SEROLOGIC RH(D): CPT

## 2018-10-19 PROCEDURE — 83036 HEMOGLOBIN GLYCOSYLATED A1C: CPT

## 2018-10-19 PROCEDURE — 70498 CT ANGIOGRAPHY NECK: CPT

## 2018-10-19 RX ORDER — CHLORPROMAZINE HCL 10 MG
25 TABLET ORAL EVERY 6 HOURS
Qty: 0 | Refills: 0 | Status: DISCONTINUED | OUTPATIENT
Start: 2018-10-19 | End: 2018-10-19

## 2018-10-19 RX ORDER — MORPHINE SULFATE 50 MG/1
0.5 CAPSULE, EXTENDED RELEASE ORAL
Qty: 100 | Refills: 0 | Status: DISCONTINUED | OUTPATIENT
Start: 2018-10-19 | End: 2018-10-19

## 2018-10-19 RX ORDER — ROBINUL 0.2 MG/ML
0.4 INJECTION INTRAMUSCULAR; INTRAVENOUS ONCE
Qty: 0 | Refills: 0 | Status: COMPLETED | OUTPATIENT
Start: 2018-10-19 | End: 2018-10-19

## 2018-10-19 RX ORDER — PANTOPRAZOLE SODIUM 20 MG/1
40 TABLET, DELAYED RELEASE ORAL DAILY
Qty: 0 | Refills: 0 | Status: DISCONTINUED | OUTPATIENT
Start: 2018-10-19 | End: 2018-10-19

## 2018-10-19 RX ORDER — MORPHINE SULFATE 50 MG/1
2 CAPSULE, EXTENDED RELEASE ORAL ONCE
Qty: 0 | Refills: 0 | Status: DISCONTINUED | OUTPATIENT
Start: 2018-10-19 | End: 2018-10-19

## 2018-10-19 RX ORDER — CHLORPROMAZINE HCL 10 MG
25 TABLET ORAL ONCE
Qty: 0 | Refills: 0 | Status: DISCONTINUED | OUTPATIENT
Start: 2018-10-19 | End: 2018-10-19

## 2018-10-19 RX ORDER — MIDAZOLAM HYDROCHLORIDE 1 MG/ML
3 INJECTION, SOLUTION INTRAMUSCULAR; INTRAVENOUS ONCE
Qty: 0 | Refills: 0 | Status: DISCONTINUED | OUTPATIENT
Start: 2018-10-19 | End: 2018-10-19

## 2018-10-19 RX ORDER — MIDAZOLAM HYDROCHLORIDE 1 MG/ML
0.03 INJECTION, SOLUTION INTRAMUSCULAR; INTRAVENOUS
Qty: 100 | Refills: 0 | Status: DISCONTINUED | OUTPATIENT
Start: 2018-10-19 | End: 2018-10-19

## 2018-10-19 RX ORDER — MORPHINE SULFATE 50 MG/1
1 CAPSULE, EXTENDED RELEASE ORAL
Qty: 100 | Refills: 0 | Status: DISCONTINUED | OUTPATIENT
Start: 2018-10-19 | End: 2018-10-19

## 2018-10-19 RX ORDER — ROBINUL 0.2 MG/ML
0.4 INJECTION INTRAMUSCULAR; INTRAVENOUS EVERY 6 HOURS
Qty: 0 | Refills: 0 | Status: DISCONTINUED | OUTPATIENT
Start: 2018-10-19 | End: 2018-10-19

## 2018-10-19 RX ORDER — MIDAZOLAM HYDROCHLORIDE 1 MG/ML
3 INJECTION, SOLUTION INTRAMUSCULAR; INTRAVENOUS
Qty: 0 | Refills: 0 | Status: DISCONTINUED | OUTPATIENT
Start: 2018-10-19 | End: 2018-10-19

## 2018-10-19 RX ADMIN — MIDAZOLAM HYDROCHLORIDE 3.16 MG/KG/HR: 1 INJECTION, SOLUTION INTRAMUSCULAR; INTRAVENOUS at 10:24

## 2018-10-19 RX ADMIN — MIDAZOLAM HYDROCHLORIDE 2.11 MG/KG/HR: 1 INJECTION, SOLUTION INTRAMUSCULAR; INTRAVENOUS at 07:28

## 2018-10-19 RX ADMIN — CHLORHEXIDINE GLUCONATE 15 MILLILITER(S): 213 SOLUTION TOPICAL at 05:55

## 2018-10-19 RX ADMIN — MIDAZOLAM HYDROCHLORIDE 3 MILLIGRAM(S): 1 INJECTION, SOLUTION INTRAMUSCULAR; INTRAVENOUS at 12:38

## 2018-10-19 RX ADMIN — Medication 3 MG/MIN: at 07:29

## 2018-10-19 RX ADMIN — ROBINUL 0.4 MILLIGRAM(S): 0.2 INJECTION INTRAMUSCULAR; INTRAVENOUS at 06:10

## 2018-10-19 RX ADMIN — MIDAZOLAM HYDROCHLORIDE 2 MILLIGRAM(S): 1 INJECTION, SOLUTION INTRAMUSCULAR; INTRAVENOUS at 06:10

## 2018-10-19 RX ADMIN — MORPHINE SULFATE 1 MG/HR: 50 CAPSULE, EXTENDED RELEASE ORAL at 10:55

## 2018-10-19 RX ADMIN — MIDAZOLAM HYDROCHLORIDE 3.16 MG/KG/HR: 1 INJECTION, SOLUTION INTRAMUSCULAR; INTRAVENOUS at 12:37

## 2018-10-19 RX ADMIN — MORPHINE SULFATE 2 MILLIGRAM(S): 50 CAPSULE, EXTENDED RELEASE ORAL at 12:51

## 2018-10-19 RX ADMIN — Medication 10 MILLIGRAM(S): at 06:11

## 2018-10-19 RX ADMIN — PANTOPRAZOLE SODIUM 40 MILLIGRAM(S): 20 TABLET, DELAYED RELEASE ORAL at 12:37

## 2018-10-19 RX ADMIN — ROBINUL 0.4 MILLIGRAM(S): 0.2 INJECTION INTRAMUSCULAR; INTRAVENOUS at 12:52

## 2018-10-19 NOTE — PROGRESS NOTE ADULT - ASSESSMENT
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 Pt brought to PCU for continued care and symptom management.    Overnight:  2 BT Versed for anxiety/ restless  3 BT Reglan for hiccups

## 2018-10-19 NOTE — PROGRESS NOTE ADULT - REASON FOR ADMISSION
left sided weakness

## 2018-10-19 NOTE — PROGRESS NOTE ADULT - PROBLEM SELECTOR PLAN 5
May be due to bile accumulation vs central injury sequale.   C/w gastric decompression via NGT  C/w versed to help aide in nausea/vomiting

## 2018-10-19 NOTE — PROGRESS NOTE ADULT - PROBLEM SELECTOR PLAN 6
Support given to immediate family   Patient was a friend to many at this hospital. While transition of full comfort takes place (i.e removal of EVD drain and palliative extubation), family requests that they are able to keep visitors to only immediate family. (patients wife, daughter, son, and sister in laws).   Palliative extubation today.
multiple family members at bedside.  Questions answered emotional support provided.    S/p GOC discussion:  -will transition precedex to versed for symptom control of nausea/vomiting, anxiety, agitation. Will d/c all nonessential meds.

## 2018-10-19 NOTE — PROGRESS NOTE ADULT - ATTENDING COMMENTS
VASCULAR NEUROLOGY ATTENDING  The patient is seen and examined the history and imaging are reviewed. I agree with the resident note unless otherwise noted. Spoke with patients family at length this AM regarding CT and physical exam findings. Explained that the prognosis for functional recovery is poor.  Family to address goals of care agree to palliative care evaluation.
Had family meeting with wife, son and niece. They had previously spoke with Josr Rogers and Ashly. They appreciate the devastating nature of patient's condition. They relayed he was a fun-loving, active and independent man who would never want a tracheostomy or PEG. They requested comfort measures. A  was called and PCU consult made.
Patient seen and examined and agree with the above documentation with the following additions:  Plan for palliative extubation today at request of family. EVD removed. Symptom management ongoing for dyspnea, pain, anxiety, hiccups.  Emotional support provided to family.
reviewed for teaching purposes only.
Patient seen and examined and agree with the above documentation with the following additions:   Emotional support provided to family at bedside given acute decompensation as documented above.  Chart reviewed; pontine hemorrhage now with devastating neurologic exam. currently in PCU for symptom directed care.  Family continuing to arrive. Further [facility] discussions regarding palliative extubation ongoing; introduced to family today.  Discontinuation of all non essential medications today. Rest of plan as documented above.

## 2018-10-19 NOTE — PROGRESS NOTE ADULT - SUBJECTIVE AND OBJECTIVE BOX
GAP TEAM PALLIATIVE CARE UNIT PROGRESS NOTE:      [  ] Patient on hospice program.    INDICATION FOR PALLIATIVE CARE UNIT SERVICES:   palliative extubation/ EOL care     INTERVAL HPI/OVERNIGHT EVENTS:   2 BT Versed for anxiety/ restless  3 BT Reglan for hiccups     DNR on chart: Yes    Allergies  No Known Allergies    Intolerances    MEDICATIONS  (STANDING):  chlorhexidine 0.12% Liquid 15 milliLiter(s) Oral Mucosa two times a day  chlorproMAZINE    IVPB 25 milliGRAM(s) IV Intermittent once  labetalol Infusion 0.05 mG/Min (3 mL/Hr) IV Continuous <Continuous>  midazolam Infusion 0.03 mG/kG/Hr (3.162 mL/Hr) IV Continuous <Continuous>  morphine  Infusion 1 mG/Hr (1 mL/Hr) IV Continuous <Continuous>  pantoprazole  Injectable 40 milliGRAM(s) IV Push daily    MEDICATIONS  (PRN):  chlorproMAZINE    Injectable 25 milliGRAM(s) IntraMuscular every 6 hours PRN hiccups  glycopyrrolate Injectable 0.4 milliGRAM(s) IV Push every 6 hours PRN secretions  metoclopramide Injectable 10 milliGRAM(s) IV Push every 6 hours PRN nausesa/vomiting  midazolam Injectable 3 milliGRAM(s) IV Push every 1 hour PRN anxiety/agitation  morphine  - Injectable 2 milliGRAM(s) IV Push every 1 hour PRN pain  morphine  - Injectable 2 milliGRAM(s) IV Push every 1 hour PRN dyspnea  ondansetron Injectable 4 milliGRAM(s) IV Push every 6 hours PRN Nausea and/or Vomiting    ITEMS UNCHECKED ARE NOT PRESENT    PRESENT SYMPTOMS: [x ]Unable to obtain due to poor mentation     Source if other than patient:  [ ]Family   [ ]Team     Pain (Impact on QOL):    Location:       Minimal acceptable level (0-10 scale):  Aggravating factors:  Quality:  Radiation:  Severity (0-10 scale):     Dyspnea:                           [ ]Mild [ ]Moderate [ ]Severe  Anxiety:                             [ ]Mild [ ]Moderate [ ]Severe  Fatigue:                             [ ]Mild [ ]Moderate [ ]Severe  Nausea:                            [ ]Mild [ ]Moderate [ ]Severe  Loss of appetite:               [ ]Mild [ ]Moderate [ ]Severe  Constipation:                    [ ]Mild [ ]Moderate [ ]Severe    PAINAD Score:  		  Other Symptoms:  [ ]All other review of systems negative     Karnofsky Performance Score/Palliative Performance Status Version 2:      10   %            PHYSICAL EXAM:    Vital Signs Last 24 Hrs  T(C): 36.9 (19 Oct 2018 07:26), Max: 36.9 (19 Oct 2018 07:26)  T(F): 98.5 (19 Oct 2018 07:26), Max: 98.5 (19 Oct 2018 07:26)  HR: 118 (19 Oct 2018 08:12) (88 - 131)  BP: 114/81 (19 Oct 2018 07:26) (114/81 - 114/81)  BP(mean): --  RR: 18 (19 Oct 2018 07:26) (18 - 18)  SpO2: 97% (19 Oct 2018 08:12) (97% - 98%)    GENERAL:  [ ]Alert  [ ]Oriented x   [ ]Lethargic  [ ]Cachexia  [x ]Unarousable  [ ]Verbal  [ x]Non-Verbal    Behavioral:   [ ] Anxiety  [ ] Delirium [ ] Agitation [ ] Other    HEENT:  [ ]Normal   [ ]Dry mouth   [ x]ET Tube/Trach  [ ]Oral lesions    PULMONARY:   [x ]Clear [ ]Tachypnea  [ ]Audible excessive secretions   [ ]Rhonchi        [ ]Right [ ]Left [ ]Bilateral  [ ]Crackles        [ ]Right [ ]Left [ ]Bilateral  [ ]Wheezing     [ ]Right [ ]Left [ ]Bilateral    CARDIOVASCULAR:    [x ]Regular [ ]Irregular [ x]Tachy  [ ]Jeremy [ ]Murmur [ ]Other    GASTROINTESTINAL:  [x ]Soft  [x ]Distended   [x ]+BS  [x]Non tender [ ]Tender  [ ]PEG [x ]OGT/ NGT   Last BM:       GENITOURINARY:  [ ]Normal [ ] Incontinent   [ ]Oliguria/Anuria   [ x]Roca    MUSCULOSKELETAL:   [ ]Normal   [ ]Weakness  [x ]Bed/Wheelchair bound [ ]Edema    NEUROLOGIC:   [ ]No focal deficits  [x ] Cognitive impairment  [ ] Dysphagia [ ]Dysarthria [ ] Paresis [ ]Other     SKIN:   [x ]Normal   [ ]Pressure ulcer(s)  [ ]Rash {x} scalp incision    CRITICAL CARE:  [ ] Shock Present  [ ]Septic [ ]Cardiogenic [ ]Neurologic [ ]Hypovolemic  [ ]  Vasopressors [ ]  Inotropes   [x ] Respiratory failure present  [ ] Acute  [ ] Chronic [ ] Hypoxic  [ ] Hypercarbic [ ] Other  [ ] Other organ failure     LABS: no new labs for review     RADIOLOGY & ADDITIONAL STUDIES: no new imaging studies    PROTEIN CALORIE MALNUTRITION: [ ] yes   [x ]no  [ ] PPSV2 < or = 30% [ ] significant weight loss [ ] poor nutritional intake [ ] anasarca [ ] catabolic state   Albumin, Serum: 4.2 g/dL (10-16-18 @ 06:53)   Artificial Nutrition [ ]     REFERRALS:   [ ]Chaplaincy  [ ] Hospice  [ ]Child Life  [ x]Social Work  [ ]Case management [ ]Holistic Therapy [ ] Physical Therapy [ ] Dietary   Goals of Care Document: Goals of Care Conversation - Personal Advance Directive [KYAW Rodriguez] (10-17-18 @ 12:12)

## 2018-10-19 NOTE — AIRWAY REMOVAL NOTE  ADULT & PEDS - ARTIFICAL AIRWAY REMOVAL COMMENTS
Written order for extubation verified. The patient was identified by full name and birth date compared to the identification band. Present during the procedure was ANJELICA Kahn.

## 2019-06-27 NOTE — PROGRESS NOTE ADULT - PROBLEM/PLAN-5
"""Follow OU ERM w/o surgery. Call if vision decreases or distortion increases. """
DISPLAY PLAN FREE TEXT
DISPLAY PLAN FREE TEXT

## 2019-07-07 NOTE — PROVIDER CONTACT NOTE (OTHER) - DATE AND TIME:
Attempted bedside study 11:15am but nurse was not available to assist. Another tech will attempt pm. 7/7/2019   19-Oct-2018 13:30

## 2021-12-27 NOTE — PATIENT PROFILE ADULT - NSPROMEDSHERBAL_GEN_A_NUR
Initiate Treatment: Synalar 0.01 % topical solution \\nApply to ears, eyebrows, and scalp bid twice a week\\nmometasone twice a week to face as needed for rash Detail Level: Zone Otc Regimen: Apply sunscreen and moisturizer throughout the day no Initiate Treatment: doxycycline hyclate 20 mg tablet \\nQuantity: 60.0 Tablet\\nSig: Take 1 tablet po bid

## 2022-02-04 NOTE — ED ADULT NURSE NOTE - PAIN: PRESENCE, MLM
Early intervention referral was made at 11/2021 visit for gross motor delay. Family can Call [phone] or [phone] to set up an evaluation, or you can can also google direct early intervention number which is based on your zip code)If mom has further concerns can be discussed more at 2/7 visit     complains of pain/discomfort

## 2024-12-06 NOTE — CONSULT NOTE ADULT - PROBLEM/RECOMMENDATION-1
1126 - Pt arrives to pacu at this time. Pt is alert. Denies pain, nausea or feeling cold. Respirs unlabored on 6L simple mask. VSS.     1130 - Placed on room air, VSS.    1135 - 3L NC placed for oxygen sats high 80s. Encouraged pt to take deep breaths. VSS.     1145 Pt continues to deny pain. Repositioned in bed for comfort. Ice chips given per request. Placed on room air. VSS.    1156 - Pt meets criteria to discharge from pacu at this time. Pt denies pain or nausea. Respirs unlabored on 2L NC. VSS. Transported to [facility] in stable condition. Family at bedside.     DISPLAY PLAN FREE TEXT

## 2025-07-30 NOTE — STROKE CODE NOTE - NIH STROKE SCALE: 1A. LEVEL OF CONSCIOUSNESS, QM
Spoke with pts daughter Sabine regarding his appointment for Lupron injection. Advised that it is scheduled for Monday August 4th at 2:45 at Dr. Christian office, verbalized understanding. Questioned if the patient had decided to proceed with radiation treatments, states that he wants to. States will come to see me on Monday before going to get injection to fill out new patient paperwork for Dr. Love's office (for [employer] and fiducials).   
(0) Alert; keenly responsive